# Patient Record
Sex: FEMALE | Race: BLACK OR AFRICAN AMERICAN | NOT HISPANIC OR LATINO | Employment: OTHER | URBAN - METROPOLITAN AREA
[De-identification: names, ages, dates, MRNs, and addresses within clinical notes are randomized per-mention and may not be internally consistent; named-entity substitution may affect disease eponyms.]

---

## 2025-01-01 ENCOUNTER — APPOINTMENT (OUTPATIENT)
Dept: RADIOLOGY | Facility: HOSPITAL | Age: 75
DRG: 871 | End: 2025-01-01
Payer: MEDICARE

## 2025-01-01 ENCOUNTER — APPOINTMENT (OUTPATIENT)
Dept: CARDIOLOGY | Facility: HOSPITAL | Age: 75
DRG: 871 | End: 2025-01-01
Payer: MEDICARE

## 2025-01-01 ENCOUNTER — HOSPITAL ENCOUNTER (INPATIENT)
Facility: HOSPITAL | Age: 75
LOS: 2 days | DRG: 871 | End: 2025-06-20
Attending: EMERGENCY MEDICINE | Admitting: INTERNAL MEDICINE
Payer: MEDICARE

## 2025-01-01 ENCOUNTER — LAB REQUISITION (OUTPATIENT)
Dept: LAB | Facility: HOSPITAL | Age: 75
End: 2025-01-01
Payer: MEDICARE

## 2025-01-01 VITALS
RESPIRATION RATE: 22 BRPM | WEIGHT: 115 LBS | HEART RATE: 44 BPM | SYSTOLIC BLOOD PRESSURE: 47 MMHG | BODY MASS INDEX: 21.16 KG/M2 | HEIGHT: 62 IN | DIASTOLIC BLOOD PRESSURE: 23 MMHG | TEMPERATURE: 99 F | OXYGEN SATURATION: 91 %

## 2025-01-01 DIAGNOSIS — J18.9 MULTIFOCAL PNEUMONIA: Primary | ICD-10-CM

## 2025-01-01 DIAGNOSIS — R62.7 ADULT FAILURE TO THRIVE: ICD-10-CM

## 2025-01-01 DIAGNOSIS — R41.82 ALTERED MENTAL STATUS, UNSPECIFIED ALTERED MENTAL STATUS TYPE: ICD-10-CM

## 2025-01-01 DIAGNOSIS — N17.9 ACUTE RENAL FAILURE, UNSPECIFIED ACUTE RENAL FAILURE TYPE: ICD-10-CM

## 2025-01-01 DIAGNOSIS — R73.9 HYPERGLYCEMIA: ICD-10-CM

## 2025-01-01 LAB
ALBUMIN SERPL BCP-MCNC: 2.4 G/DL (ref 3.4–5)
ALBUMIN SERPL BCP-MCNC: 3.5 G/DL (ref 3.4–5)
ALP SERPL-CCNC: 519 U/L (ref 33–136)
ALP SERPL-CCNC: 628 U/L (ref 33–136)
ALT SERPL W P-5'-P-CCNC: 66 U/L (ref 7–45)
ALT SERPL W P-5'-P-CCNC: 89 U/L (ref 7–45)
AMMONIA PLAS-SCNC: 54 UMOL/L (ref 16–53)
ANION GAP SERPL CALC-SCNC: 16 MMOL/L (ref 10–20)
ANION GAP SERPL CALC-SCNC: 17 MMOL/L (ref 10–20)
ANION GAP SERPL CALC-SCNC: 19 MMOL/L (ref 10–20)
ANION GAP SERPL CALC-SCNC: 20 MMOL/L (ref 10–20)
ANION GAP SERPL CALC-SCNC: 20 MMOL/L (ref 10–20)
APPEARANCE UR: ABNORMAL
AST SERPL W P-5'-P-CCNC: 138 U/L (ref 9–39)
AST SERPL W P-5'-P-CCNC: 148 U/L (ref 9–39)
BASE EXCESS BLDV CALC-SCNC: -5 MMOL/L (ref -2–3)
BASOPHILS # BLD AUTO: 0.03 X10*3/UL (ref 0–0.1)
BASOPHILS # BLD AUTO: 0.03 X10*3/UL (ref 0–0.1)
BASOPHILS NFR BLD AUTO: 0.5 %
BASOPHILS NFR BLD AUTO: 0.5 %
BILIRUB SERPL-MCNC: 0.9 MG/DL (ref 0–1.2)
BILIRUB SERPL-MCNC: 1.3 MG/DL (ref 0–1.2)
BILIRUB UR STRIP.AUTO-MCNC: ABNORMAL MG/DL
BODY TEMPERATURE: 37 DEGREES CELSIUS
BUN SERPL-MCNC: 46 MG/DL (ref 6–23)
BUN SERPL-MCNC: 55 MG/DL (ref 6–23)
BUN SERPL-MCNC: 57 MG/DL (ref 6–23)
BUN SERPL-MCNC: 62 MG/DL (ref 6–23)
BUN SERPL-MCNC: 65 MG/DL (ref 6–23)
CALCIUM SERPL-MCNC: 8.2 MG/DL (ref 8.6–10.3)
CALCIUM SERPL-MCNC: 8.5 MG/DL (ref 8.6–10.3)
CALCIUM SERPL-MCNC: 8.9 MG/DL (ref 8.6–10.3)
CALCIUM SERPL-MCNC: 9.2 MG/DL (ref 8.6–10.3)
CALCIUM SERPL-MCNC: 9.3 MG/DL (ref 8.6–10.3)
CARDIAC TROPONIN I PNL SERPL HS: 13 NG/L (ref 0–13)
CHLORIDE SERPL-SCNC: 104 MMOL/L (ref 98–107)
CHLORIDE SERPL-SCNC: 104 MMOL/L (ref 98–107)
CHLORIDE SERPL-SCNC: 106 MMOL/L (ref 98–107)
CHLORIDE SERPL-SCNC: 110 MMOL/L (ref 98–107)
CHLORIDE SERPL-SCNC: 114 MMOL/L (ref 98–107)
CO2 SERPL-SCNC: 15 MMOL/L (ref 21–32)
CO2 SERPL-SCNC: 17 MMOL/L (ref 21–32)
CO2 SERPL-SCNC: 18 MMOL/L (ref 21–32)
CO2 SERPL-SCNC: 18 MMOL/L (ref 21–32)
CO2 SERPL-SCNC: 21 MMOL/L (ref 21–32)
COLOR UR: ABNORMAL
CREAT SERPL-MCNC: 1.59 MG/DL (ref 0.5–1.05)
CREAT SERPL-MCNC: 2.21 MG/DL (ref 0.5–1.05)
CREAT SERPL-MCNC: 2.24 MG/DL (ref 0.5–1.05)
CREAT SERPL-MCNC: 2.25 MG/DL (ref 0.5–1.05)
CREAT SERPL-MCNC: 2.39 MG/DL (ref 0.5–1.05)
EGFRCR SERPLBLD CKD-EPI 2021: 21 ML/MIN/1.73M*2
EGFRCR SERPLBLD CKD-EPI 2021: 22 ML/MIN/1.73M*2
EGFRCR SERPLBLD CKD-EPI 2021: 22 ML/MIN/1.73M*2
EGFRCR SERPLBLD CKD-EPI 2021: 23 ML/MIN/1.73M*2
EGFRCR SERPLBLD CKD-EPI 2021: 34 ML/MIN/1.73M*2
EOSINOPHIL # BLD AUTO: 0 X10*3/UL (ref 0–0.4)
EOSINOPHIL # BLD AUTO: 0.1 X10*3/UL (ref 0–0.4)
EOSINOPHIL NFR BLD AUTO: 0 %
EOSINOPHIL NFR BLD AUTO: 1.6 %
ERYTHROCYTE [DISTWIDTH] IN BLOOD BY AUTOMATED COUNT: 15.9 % (ref 11.5–14.5)
ERYTHROCYTE [DISTWIDTH] IN BLOOD BY AUTOMATED COUNT: 16 % (ref 11.5–14.5)
ERYTHROCYTE [DISTWIDTH] IN BLOOD BY AUTOMATED COUNT: 16.3 % (ref 11.5–14.5)
ERYTHROCYTE [DISTWIDTH] IN BLOOD BY AUTOMATED COUNT: 16.9 % (ref 11.5–14.5)
FLUAV RNA RESP QL NAA+PROBE: NOT DETECTED
FLUBV RNA RESP QL NAA+PROBE: NOT DETECTED
GLUCOSE BLD MANUAL STRIP-MCNC: 120 MG/DL (ref 74–99)
GLUCOSE BLD MANUAL STRIP-MCNC: 126 MG/DL (ref 74–99)
GLUCOSE BLD MANUAL STRIP-MCNC: 128 MG/DL (ref 74–99)
GLUCOSE BLD MANUAL STRIP-MCNC: 134 MG/DL (ref 74–99)
GLUCOSE BLD MANUAL STRIP-MCNC: 143 MG/DL (ref 74–99)
GLUCOSE BLD MANUAL STRIP-MCNC: 146 MG/DL (ref 74–99)
GLUCOSE BLD MANUAL STRIP-MCNC: 148 MG/DL (ref 74–99)
GLUCOSE BLD MANUAL STRIP-MCNC: 149 MG/DL (ref 74–99)
GLUCOSE BLD MANUAL STRIP-MCNC: 156 MG/DL (ref 74–99)
GLUCOSE BLD MANUAL STRIP-MCNC: 157 MG/DL (ref 74–99)
GLUCOSE BLD MANUAL STRIP-MCNC: 191 MG/DL (ref 74–99)
GLUCOSE BLD MANUAL STRIP-MCNC: 205 MG/DL (ref 74–99)
GLUCOSE BLD MANUAL STRIP-MCNC: 206 MG/DL (ref 74–99)
GLUCOSE BLD MANUAL STRIP-MCNC: 228 MG/DL (ref 74–99)
GLUCOSE BLD MANUAL STRIP-MCNC: 276 MG/DL (ref 74–99)
GLUCOSE SERPL-MCNC: 145 MG/DL (ref 74–99)
GLUCOSE SERPL-MCNC: 145 MG/DL (ref 74–99)
GLUCOSE SERPL-MCNC: 158 MG/DL (ref 74–99)
GLUCOSE SERPL-MCNC: 260 MG/DL (ref 74–99)
GLUCOSE SERPL-MCNC: 267 MG/DL (ref 74–99)
GLUCOSE UR STRIP.AUTO-MCNC: ABNORMAL MG/DL
HCO3 BLDV-SCNC: 18.4 MMOL/L (ref 22–26)
HCT VFR BLD AUTO: 32.1 % (ref 36–46)
HCT VFR BLD AUTO: 34.3 % (ref 36–46)
HCT VFR BLD AUTO: 34.8 % (ref 36–46)
HCT VFR BLD AUTO: 35.4 % (ref 36–46)
HGB BLD-MCNC: 10.3 G/DL (ref 12–16)
HGB BLD-MCNC: 10.3 G/DL (ref 12–16)
HGB BLD-MCNC: 11.2 G/DL (ref 12–16)
HGB BLD-MCNC: 11.2 G/DL (ref 12–16)
HOLD SPECIMEN: NORMAL
IMM GRANULOCYTES # BLD AUTO: 0.03 X10*3/UL (ref 0–0.5)
IMM GRANULOCYTES # BLD AUTO: 0.04 X10*3/UL (ref 0–0.5)
IMM GRANULOCYTES NFR BLD AUTO: 0.5 % (ref 0–0.9)
IMM GRANULOCYTES NFR BLD AUTO: 0.7 % (ref 0–0.9)
INHALED O2 CONCENTRATION: 24 %
KETONES UR STRIP.AUTO-MCNC: NEGATIVE MG/DL
LACTATE SERPL-SCNC: 2.3 MMOL/L (ref 0.4–2)
LACTATE SERPL-SCNC: 2.6 MMOL/L (ref 0.4–2)
LACTATE SERPL-SCNC: 2.7 MMOL/L (ref 0.4–2)
LACTATE SERPL-SCNC: 2.9 MMOL/L (ref 0.4–2)
LACTATE SERPL-SCNC: 2.9 MMOL/L (ref 0.4–2)
LACTATE SERPL-SCNC: 3.2 MMOL/L (ref 0.4–2)
LACTATE SERPL-SCNC: 4.3 MMOL/L (ref 0.4–2)
LEUKOCYTE ESTERASE UR QL STRIP.AUTO: NEGATIVE
LYMPHOCYTES # BLD AUTO: 0.7 X10*3/UL (ref 0.8–3)
LYMPHOCYTES # BLD AUTO: 0.81 X10*3/UL (ref 0.8–3)
LYMPHOCYTES NFR BLD AUTO: 11.7 %
LYMPHOCYTES NFR BLD AUTO: 13.3 %
MAGNESIUM SERPL-MCNC: 2.22 MG/DL (ref 1.6–2.4)
MCH RBC QN AUTO: 28.7 PG (ref 26–34)
MCH RBC QN AUTO: 28.8 PG (ref 26–34)
MCH RBC QN AUTO: 28.9 PG (ref 26–34)
MCH RBC QN AUTO: 29.2 PG (ref 26–34)
MCHC RBC AUTO-ENTMCNC: 29.1 G/DL (ref 32–36)
MCHC RBC AUTO-ENTMCNC: 32.1 G/DL (ref 32–36)
MCHC RBC AUTO-ENTMCNC: 32.2 G/DL (ref 32–36)
MCHC RBC AUTO-ENTMCNC: 32.7 G/DL (ref 32–36)
MCV RBC AUTO: 89 FL (ref 80–100)
MCV RBC AUTO: 90 FL (ref 80–100)
MCV RBC AUTO: 90 FL (ref 80–100)
MCV RBC AUTO: 99 FL (ref 80–100)
MONOCYTES # BLD AUTO: 0.48 X10*3/UL (ref 0.05–0.8)
MONOCYTES # BLD AUTO: 0.5 X10*3/UL (ref 0.05–0.8)
MONOCYTES NFR BLD AUTO: 8 %
MONOCYTES NFR BLD AUTO: 8.2 %
MRSA DNA SPEC QL NAA+PROBE: NOT DETECTED
NEUTROPHILS # BLD AUTO: 4.63 X10*3/UL (ref 1.6–5.5)
NEUTROPHILS # BLD AUTO: 4.74 X10*3/UL (ref 1.6–5.5)
NEUTROPHILS NFR BLD AUTO: 75.9 %
NEUTROPHILS NFR BLD AUTO: 79.1 %
NITRITE UR QL STRIP.AUTO: NEGATIVE
NRBC BLD-RTO: 0 /100 WBCS (ref 0–0)
NRBC BLD-RTO: 0.6 /100 WBCS (ref 0–0)
OXYHGB MFR BLDV: 91 % (ref 45–75)
PCO2 BLDV: 29 MM HG (ref 41–51)
PH BLDV: 7.41 PH (ref 7.33–7.43)
PH UR STRIP.AUTO: 5 [PH]
PLATELET # BLD AUTO: 155 X10*3/UL (ref 150–450)
PLATELET # BLD AUTO: 181 X10*3/UL (ref 150–450)
PLATELET # BLD AUTO: 203 X10*3/UL (ref 150–450)
PLATELET # BLD AUTO: 205 X10*3/UL (ref 150–450)
PO2 BLDV: 66 MM HG (ref 35–45)
POTASSIUM SERPL-SCNC: 4.8 MMOL/L (ref 3.5–5.3)
POTASSIUM SERPL-SCNC: 5.1 MMOL/L (ref 3.5–5.3)
POTASSIUM SERPL-SCNC: 5.4 MMOL/L (ref 3.5–5.3)
POTASSIUM SERPL-SCNC: 5.5 MMOL/L (ref 3.5–5.3)
POTASSIUM SERPL-SCNC: 5.6 MMOL/L (ref 3.5–5.3)
PROT SERPL-MCNC: 4.9 G/DL (ref 6.4–8.2)
PROT SERPL-MCNC: 6.9 G/DL (ref 6.4–8.2)
PROT UR STRIP.AUTO-MCNC: ABNORMAL MG/DL
RBC # BLD AUTO: 3.56 X10*6/UL (ref 4–5.2)
RBC # BLD AUTO: 3.58 X10*6/UL (ref 4–5.2)
RBC # BLD AUTO: 3.83 X10*6/UL (ref 4–5.2)
RBC # BLD AUTO: 3.9 X10*6/UL (ref 4–5.2)
RBC # UR STRIP.AUTO: NEGATIVE MG/DL
RBC #/AREA URNS AUTO: ABNORMAL /HPF
SAO2 % BLDV: 93 % (ref 45–75)
SARS-COV-2 RNA RESP QL NAA+PROBE: NOT DETECTED
SODIUM SERPL-SCNC: 136 MMOL/L (ref 136–145)
SODIUM SERPL-SCNC: 141 MMOL/L (ref 136–145)
SODIUM SERPL-SCNC: 143 MMOL/L (ref 136–145)
SP GR UR STRIP.AUTO: 1.02
UROBILINOGEN UR STRIP.AUTO-MCNC: ABNORMAL MG/DL
WBC # BLD AUTO: 6 X10*3/UL (ref 4.4–11.3)
WBC # BLD AUTO: 6.1 X10*3/UL (ref 4.4–11.3)
WBC # BLD AUTO: 6.2 X10*3/UL (ref 4.4–11.3)
WBC # BLD AUTO: 8.3 X10*3/UL (ref 4.4–11.3)
WBC #/AREA URNS AUTO: ABNORMAL /HPF

## 2025-01-01 PROCEDURE — 84075 ASSAY ALKALINE PHOSPHATASE: CPT | Performed by: INTERNAL MEDICINE

## 2025-01-01 PROCEDURE — 84484 ASSAY OF TROPONIN QUANT: CPT | Performed by: PHYSICIAN ASSISTANT

## 2025-01-01 PROCEDURE — 83605 ASSAY OF LACTIC ACID: CPT

## 2025-01-01 PROCEDURE — 36415 COLL VENOUS BLD VENIPUNCTURE: CPT | Performed by: INTERNAL MEDICINE

## 2025-01-01 PROCEDURE — 36415 COLL VENOUS BLD VENIPUNCTURE: CPT

## 2025-01-01 PROCEDURE — 82140 ASSAY OF AMMONIA: CPT | Performed by: PHYSICIAN ASSISTANT

## 2025-01-01 PROCEDURE — 2500000004 HC RX 250 GENERAL PHARMACY W/ HCPCS (ALT 636 FOR OP/ED): Performed by: NURSE PRACTITIONER

## 2025-01-01 PROCEDURE — 2500000004 HC RX 250 GENERAL PHARMACY W/ HCPCS (ALT 636 FOR OP/ED)

## 2025-01-01 PROCEDURE — 2500000002 HC RX 250 W HCPCS SELF ADMINISTERED DRUGS (ALT 637 FOR MEDICARE OP, ALT 636 FOR OP/ED): Performed by: NURSE PRACTITIONER

## 2025-01-01 PROCEDURE — 80053 COMPREHEN METABOLIC PANEL: CPT | Performed by: PHYSICIAN ASSISTANT

## 2025-01-01 PROCEDURE — 83605 ASSAY OF LACTIC ACID: CPT | Performed by: NURSE PRACTITIONER

## 2025-01-01 PROCEDURE — 70450 CT HEAD/BRAIN W/O DYE: CPT | Performed by: STUDENT IN AN ORGANIZED HEALTH CARE EDUCATION/TRAINING PROGRAM

## 2025-01-01 PROCEDURE — 80048 BASIC METABOLIC PNL TOTAL CA: CPT | Mod: OUT | Performed by: INTERNAL MEDICINE

## 2025-01-01 PROCEDURE — 2060000001 HC INTERMEDIATE ICU ROOM DAILY

## 2025-01-01 PROCEDURE — 85027 COMPLETE CBC AUTOMATED: CPT | Performed by: NURSE PRACTITIONER

## 2025-01-01 PROCEDURE — 80048 BASIC METABOLIC PNL TOTAL CA: CPT

## 2025-01-01 PROCEDURE — 87636 SARSCOV2 & INF A&B AMP PRB: CPT | Performed by: PHYSICIAN ASSISTANT

## 2025-01-01 PROCEDURE — 70450 CT HEAD/BRAIN W/O DYE: CPT

## 2025-01-01 PROCEDURE — 83605 ASSAY OF LACTIC ACID: CPT | Performed by: INTERNAL MEDICINE

## 2025-01-01 PROCEDURE — 87040 BLOOD CULTURE FOR BACTERIA: CPT | Mod: PARLAB | Performed by: PHYSICIAN ASSISTANT

## 2025-01-01 PROCEDURE — 99285 EMERGENCY DEPT VISIT HI MDM: CPT | Performed by: EMERGENCY MEDICINE

## 2025-01-01 PROCEDURE — 36415 COLL VENOUS BLD VENIPUNCTURE: CPT | Performed by: PHYSICIAN ASSISTANT

## 2025-01-01 PROCEDURE — 82947 ASSAY GLUCOSE BLOOD QUANT: CPT

## 2025-01-01 PROCEDURE — 85027 COMPLETE CBC AUTOMATED: CPT

## 2025-01-01 PROCEDURE — 85025 COMPLETE CBC W/AUTO DIFF WBC: CPT | Mod: OUT | Performed by: INTERNAL MEDICINE

## 2025-01-01 PROCEDURE — 87075 CULTR BACTERIA EXCEPT BLOOD: CPT | Mod: PARLAB | Performed by: PHYSICIAN ASSISTANT

## 2025-01-01 PROCEDURE — 2500000004 HC RX 250 GENERAL PHARMACY W/ HCPCS (ALT 636 FOR OP/ED): Performed by: PHYSICIAN ASSISTANT

## 2025-01-01 PROCEDURE — 80048 BASIC METABOLIC PNL TOTAL CA: CPT | Mod: CCI | Performed by: NURSE PRACTITIONER

## 2025-01-01 PROCEDURE — 82805 BLOOD GASES W/O2 SATURATION: CPT | Performed by: PHYSICIAN ASSISTANT

## 2025-01-01 PROCEDURE — 94640 AIRWAY INHALATION TREATMENT: CPT

## 2025-01-01 PROCEDURE — 99222 1ST HOSP IP/OBS MODERATE 55: CPT | Performed by: NURSE PRACTITIONER

## 2025-01-01 PROCEDURE — 87640 STAPH A DNA AMP PROBE: CPT | Performed by: PHYSICIAN ASSISTANT

## 2025-01-01 PROCEDURE — 99231 SBSQ HOSP IP/OBS SF/LOW 25: CPT | Performed by: INTERNAL MEDICINE

## 2025-01-01 PROCEDURE — 83605 ASSAY OF LACTIC ACID: CPT | Performed by: PHYSICIAN ASSISTANT

## 2025-01-01 PROCEDURE — 96365 THER/PROPH/DIAG IV INF INIT: CPT

## 2025-01-01 PROCEDURE — 81001 URINALYSIS AUTO W/SCOPE: CPT | Performed by: PHYSICIAN ASSISTANT

## 2025-01-01 PROCEDURE — 85025 COMPLETE CBC W/AUTO DIFF WBC: CPT | Performed by: PHYSICIAN ASSISTANT

## 2025-01-01 PROCEDURE — 71045 X-RAY EXAM CHEST 1 VIEW: CPT | Mod: FOREIGN READ | Performed by: RADIOLOGY

## 2025-01-01 PROCEDURE — 83735 ASSAY OF MAGNESIUM: CPT | Performed by: PHYSICIAN ASSISTANT

## 2025-01-01 PROCEDURE — 71045 X-RAY EXAM CHEST 1 VIEW: CPT

## 2025-01-01 PROCEDURE — 2500000004 HC RX 250 GENERAL PHARMACY W/ HCPCS (ALT 636 FOR OP/ED): Performed by: INTERNAL MEDICINE

## 2025-01-01 PROCEDURE — 93005 ELECTROCARDIOGRAM TRACING: CPT

## 2025-01-01 RX ORDER — DEXAMETHASONE 4 MG/1
2 TABLET ORAL DAILY
Status: DISCONTINUED | OUTPATIENT
Start: 2025-01-01 | End: 2025-01-01 | Stop reason: HOSPADM

## 2025-01-01 RX ORDER — PRAVASTATIN SODIUM 10 MG/1
10 TABLET ORAL NIGHTLY
COMMUNITY

## 2025-01-01 RX ORDER — DEXAMETHASONE 2 MG/1
2 TABLET ORAL DAILY
COMMUNITY

## 2025-01-01 RX ORDER — ONDANSETRON 8 MG/1
8 TABLET, FILM COATED ORAL EVERY 8 HOURS PRN
COMMUNITY

## 2025-01-01 RX ORDER — VANCOMYCIN HYDROCHLORIDE 1 G/20ML
INJECTION, POWDER, LYOPHILIZED, FOR SOLUTION INTRAVENOUS DAILY PRN
Status: DISCONTINUED | OUTPATIENT
Start: 2025-01-01 | End: 2025-01-01

## 2025-01-01 RX ORDER — FUROSEMIDE 20 MG/1
20 TABLET ORAL DAILY
Status: DISCONTINUED | OUTPATIENT
Start: 2025-01-01 | End: 2025-01-01 | Stop reason: HOSPADM

## 2025-01-01 RX ORDER — MORPHINE SULFATE 2 MG/ML
2 INJECTION, SOLUTION INTRAMUSCULAR; INTRAVENOUS EVERY 4 HOURS PRN
Status: DISCONTINUED | OUTPATIENT
Start: 2025-01-01 | End: 2025-01-01 | Stop reason: HOSPADM

## 2025-01-01 RX ORDER — VANCOMYCIN HYDROCHLORIDE 1 G/200ML
1 INJECTION, SOLUTION INTRAVENOUS ONCE
Status: COMPLETED | OUTPATIENT
Start: 2025-01-01 | End: 2025-01-01

## 2025-01-01 RX ORDER — SODIUM CHLORIDE 1000 MG
1 TABLET, SOLUBLE MISCELLANEOUS 3 TIMES DAILY
Status: DISCONTINUED | OUTPATIENT
Start: 2025-01-01 | End: 2025-01-01 | Stop reason: HOSPADM

## 2025-01-01 RX ORDER — PRAVASTATIN SODIUM 20 MG/1
10 TABLET ORAL NIGHTLY
Status: DISCONTINUED | OUTPATIENT
Start: 2025-01-01 | End: 2025-01-01 | Stop reason: HOSPADM

## 2025-01-01 RX ORDER — SODIUM CHLORIDE, SODIUM LACTATE, POTASSIUM CHLORIDE, CALCIUM CHLORIDE 600; 310; 30; 20 MG/100ML; MG/100ML; MG/100ML; MG/100ML
75 INJECTION, SOLUTION INTRAVENOUS CONTINUOUS
Status: ACTIVE | OUTPATIENT
Start: 2025-01-01 | End: 2025-01-01

## 2025-01-01 RX ORDER — FOLIC ACID 1 MG/1
1 TABLET ORAL DAILY
COMMUNITY

## 2025-01-01 RX ORDER — FAMOTIDINE 20 MG/1
20 TABLET, FILM COATED ORAL DAILY
Status: DISCONTINUED | OUTPATIENT
Start: 2025-01-01 | End: 2025-01-01

## 2025-01-01 RX ORDER — METFORMIN HYDROCHLORIDE 500 MG/1
1000 TABLET ORAL
Status: DISCONTINUED | OUTPATIENT
Start: 2025-01-01 | End: 2025-01-01 | Stop reason: HOSPADM

## 2025-01-01 RX ORDER — OXYCODONE HYDROCHLORIDE 5 MG/1
5 TABLET ORAL
COMMUNITY

## 2025-01-01 RX ORDER — HEPARIN SODIUM 5000 [USP'U]/ML
5000 INJECTION, SOLUTION INTRAVENOUS; SUBCUTANEOUS EVERY 8 HOURS
Status: DISCONTINUED | OUTPATIENT
Start: 2025-01-01 | End: 2025-01-01 | Stop reason: HOSPADM

## 2025-01-01 RX ORDER — DEXTROSE 50 % IN WATER (D50W) INTRAVENOUS SYRINGE
25
Status: DISCONTINUED | OUTPATIENT
Start: 2025-01-01 | End: 2025-01-01 | Stop reason: HOSPADM

## 2025-01-01 RX ORDER — IPRATROPIUM BROMIDE AND ALBUTEROL SULFATE 2.5; .5 MG/3ML; MG/3ML
3 SOLUTION RESPIRATORY (INHALATION)
COMMUNITY

## 2025-01-01 RX ORDER — FAMOTIDINE 20 MG/1
10 TABLET, FILM COATED ORAL
Status: DISCONTINUED | OUTPATIENT
Start: 2025-06-21 | End: 2025-01-01 | Stop reason: HOSPADM

## 2025-01-01 RX ORDER — INSULIN LISPRO 100 [IU]/ML
0-10 INJECTION, SOLUTION INTRAVENOUS; SUBCUTANEOUS EVERY 4 HOURS
Status: DISCONTINUED | OUTPATIENT
Start: 2025-01-01 | End: 2025-01-01 | Stop reason: HOSPADM

## 2025-01-01 RX ORDER — LEVOFLOXACIN 25 MG/ML
500 SOLUTION ORAL DAILY
COMMUNITY

## 2025-01-01 RX ORDER — OXYCODONE HYDROCHLORIDE 5 MG/1
5 TABLET ORAL
Refills: 0 | Status: DISCONTINUED | OUTPATIENT
Start: 2025-01-01 | End: 2025-01-01 | Stop reason: HOSPADM

## 2025-01-01 RX ORDER — LORAZEPAM 2 MG/ML
2 INJECTION INTRAMUSCULAR EVERY 4 HOURS PRN
Status: DISCONTINUED | OUTPATIENT
Start: 2025-01-01 | End: 2025-01-01 | Stop reason: HOSPADM

## 2025-01-01 RX ORDER — POLYETHYLENE GLYCOL 3350 17 G/17G
17 POWDER, FOR SOLUTION ORAL DAILY PRN
COMMUNITY

## 2025-01-01 RX ORDER — ACETAMINOPHEN 650 MG/1
650 SUPPOSITORY RECTAL EVERY 4 HOURS PRN
Status: DISCONTINUED | OUTPATIENT
Start: 2025-01-01 | End: 2025-01-01 | Stop reason: HOSPADM

## 2025-01-01 RX ORDER — VIT C/E/ZN/COPPR/LUTEIN/ZEAXAN 250MG-90MG
25 CAPSULE ORAL DAILY
COMMUNITY

## 2025-01-01 RX ORDER — ONDANSETRON 4 MG/1
8 TABLET, FILM COATED ORAL EVERY 12 HOURS PRN
Status: DISCONTINUED | OUTPATIENT
Start: 2025-01-01 | End: 2025-01-01 | Stop reason: HOSPADM

## 2025-01-01 RX ORDER — SODIUM CHLORIDE 1000 MG
1 TABLET, SOLUBLE MISCELLANEOUS 3 TIMES DAILY
COMMUNITY

## 2025-01-01 RX ORDER — FUROSEMIDE 20 MG/1
20 TABLET ORAL DAILY
COMMUNITY

## 2025-01-01 RX ORDER — DEXTROSE 50 % IN WATER (D50W) INTRAVENOUS SYRINGE
12.5
Status: DISCONTINUED | OUTPATIENT
Start: 2025-01-01 | End: 2025-01-01 | Stop reason: HOSPADM

## 2025-01-01 RX ORDER — MEMANTINE HYDROCHLORIDE 10 MG/1
10 TABLET ORAL DAILY
COMMUNITY

## 2025-01-01 RX ORDER — POLYETHYLENE GLYCOL 3350 17 G/17G
17 POWDER, FOR SOLUTION ORAL DAILY PRN
Status: DISCONTINUED | OUTPATIENT
Start: 2025-01-01 | End: 2025-01-01 | Stop reason: HOSPADM

## 2025-01-01 RX ORDER — IPRATROPIUM BROMIDE AND ALBUTEROL SULFATE 2.5; .5 MG/3ML; MG/3ML
3 SOLUTION RESPIRATORY (INHALATION)
Status: DISCONTINUED | OUTPATIENT
Start: 2025-01-01 | End: 2025-01-01

## 2025-01-01 RX ORDER — BISACODYL 10 MG/1
10 SUPPOSITORY RECTAL DAILY PRN
COMMUNITY

## 2025-01-01 RX ORDER — FOLIC ACID 1 MG/1
1 TABLET ORAL DAILY
Status: DISCONTINUED | OUTPATIENT
Start: 2025-01-01 | End: 2025-01-01 | Stop reason: HOSPADM

## 2025-01-01 RX ORDER — IPRATROPIUM BROMIDE AND ALBUTEROL SULFATE 2.5; .5 MG/3ML; MG/3ML
3 SOLUTION RESPIRATORY (INHALATION) EVERY 2 HOUR PRN
Status: DISCONTINUED | OUTPATIENT
Start: 2025-01-01 | End: 2025-01-01 | Stop reason: HOSPADM

## 2025-01-01 RX ORDER — METFORMIN HYDROCHLORIDE 500 MG/1
1000 TABLET ORAL
COMMUNITY

## 2025-01-01 RX ORDER — BISACODYL 10 MG/1
10 SUPPOSITORY RECTAL DAILY PRN
Status: DISCONTINUED | OUTPATIENT
Start: 2025-01-01 | End: 2025-01-01 | Stop reason: HOSPADM

## 2025-01-01 RX ORDER — SODIUM CHLORIDE, SODIUM LACTATE, POTASSIUM CHLORIDE, CALCIUM CHLORIDE 600; 310; 30; 20 MG/100ML; MG/100ML; MG/100ML; MG/100ML
75 INJECTION, SOLUTION INTRAVENOUS CONTINUOUS
Status: DISCONTINUED | OUTPATIENT
Start: 2025-01-01 | End: 2025-01-01 | Stop reason: HOSPADM

## 2025-01-01 RX ORDER — MEMANTINE HYDROCHLORIDE 5 MG/1
10 TABLET ORAL DAILY
Status: DISCONTINUED | OUTPATIENT
Start: 2025-01-01 | End: 2025-01-01 | Stop reason: HOSPADM

## 2025-01-01 RX ORDER — ACETAMINOPHEN 325 MG/1
650 TABLET ORAL EVERY 6 HOURS PRN
COMMUNITY

## 2025-01-01 RX ORDER — FAMOTIDINE 20 MG/1
20 TABLET, FILM COATED ORAL DAILY
COMMUNITY

## 2025-01-01 RX ADMIN — PIPERACILLIN SODIUM AND TAZOBACTAM SODIUM 2.25 G: 2; .25 INJECTION, SOLUTION INTRAVENOUS at 02:55

## 2025-01-01 RX ADMIN — PIPERACILLIN SODIUM AND TAZOBACTAM SODIUM 2.25 G: 2; .25 INJECTION, SOLUTION INTRAVENOUS at 13:41

## 2025-01-01 RX ADMIN — PIPERACILLIN SODIUM AND TAZOBACTAM SODIUM 2.25 G: 2; .25 INJECTION, SOLUTION INTRAVENOUS at 22:00

## 2025-01-01 RX ADMIN — INSULIN LISPRO 2 UNITS: 100 INJECTION, SOLUTION INTRAVENOUS; SUBCUTANEOUS at 04:03

## 2025-01-01 RX ADMIN — INSULIN LISPRO 1 UNITS: 100 INJECTION, SOLUTION INTRAVENOUS; SUBCUTANEOUS at 11:59

## 2025-01-01 RX ADMIN — SODIUM CHLORIDE, SODIUM LACTATE, POTASSIUM CHLORIDE, AND CALCIUM CHLORIDE 75 ML/HR: .6; .31; .03; .02 INJECTION, SOLUTION INTRAVENOUS at 12:35

## 2025-01-01 RX ADMIN — HEPARIN SODIUM 5000 UNITS: 5000 INJECTION, SOLUTION INTRAVENOUS; SUBCUTANEOUS at 12:33

## 2025-01-01 RX ADMIN — INSULIN LISPRO 6 UNITS: 100 INJECTION, SOLUTION INTRAVENOUS; SUBCUTANEOUS at 12:08

## 2025-01-01 RX ADMIN — SODIUM CHLORIDE, SODIUM LACTATE, POTASSIUM CHLORIDE, AND CALCIUM CHLORIDE 75 ML/HR: .6; .31; .03; .02 INJECTION, SOLUTION INTRAVENOUS at 16:40

## 2025-01-01 RX ADMIN — SODIUM CHLORIDE, SODIUM LACTATE, POTASSIUM CHLORIDE, AND CALCIUM CHLORIDE 75 ML/HR: .6; .31; .03; .02 INJECTION, SOLUTION INTRAVENOUS at 22:01

## 2025-01-01 RX ADMIN — SODIUM CHLORIDE, SODIUM LACTATE, POTASSIUM CHLORIDE, AND CALCIUM CHLORIDE 500 ML: .6; .31; .03; .02 INJECTION, SOLUTION INTRAVENOUS at 16:37

## 2025-01-01 RX ADMIN — PIPERACILLIN SODIUM AND TAZOBACTAM SODIUM 2.25 G: 2; .25 INJECTION, SOLUTION INTRAVENOUS at 14:08

## 2025-01-01 RX ADMIN — SODIUM CHLORIDE 1000 ML: 0.9 INJECTION, SOLUTION INTRAVENOUS at 17:34

## 2025-01-01 RX ADMIN — PIPERACILLIN SODIUM AND TAZOBACTAM SODIUM 2.25 G: 2; .25 INJECTION, SOLUTION INTRAVENOUS at 20:00

## 2025-01-01 RX ADMIN — PIPERACILLIN SODIUM AND TAZOBACTAM SODIUM 4.5 G: 4; .5 INJECTION, SOLUTION INTRAVENOUS at 02:35

## 2025-01-01 RX ADMIN — PIPERACILLIN SODIUM AND TAZOBACTAM SODIUM 2.25 G: 2; .25 INJECTION, SOLUTION INTRAVENOUS at 09:30

## 2025-01-01 RX ADMIN — VANCOMYCIN HYDROCHLORIDE 1 G: 1 INJECTION, SOLUTION INTRAVENOUS at 03:21

## 2025-01-01 RX ADMIN — HEPARIN SODIUM 5000 UNITS: 5000 INJECTION, SOLUTION INTRAVENOUS; SUBCUTANEOUS at 04:03

## 2025-01-01 RX ADMIN — PIPERACILLIN SODIUM AND TAZOBACTAM SODIUM 2.25 G: 2; .25 INJECTION, SOLUTION INTRAVENOUS at 02:20

## 2025-01-01 RX ADMIN — PIPERACILLIN SODIUM AND TAZOBACTAM SODIUM 2.25 G: 2; .25 INJECTION, SOLUTION INTRAVENOUS at 15:26

## 2025-01-01 RX ADMIN — PIPERACILLIN SODIUM AND TAZOBACTAM SODIUM 2.25 G: 2; .25 INJECTION, SOLUTION INTRAVENOUS at 07:41

## 2025-01-01 RX ADMIN — HEPARIN SODIUM 5000 UNITS: 5000 INJECTION, SOLUTION INTRAVENOUS; SUBCUTANEOUS at 11:56

## 2025-01-01 RX ADMIN — SODIUM CHLORIDE, SODIUM LACTATE, POTASSIUM CHLORIDE, AND CALCIUM CHLORIDE 50 ML/HR: .6; .31; .03; .02 INJECTION, SOLUTION INTRAVENOUS at 04:31

## 2025-01-01 RX ADMIN — IPRATROPIUM BROMIDE AND ALBUTEROL SULFATE 3 ML: .5; 3 SOLUTION RESPIRATORY (INHALATION) at 03:23

## 2025-01-01 RX ADMIN — HEPARIN SODIUM 5000 UNITS: 5000 INJECTION, SOLUTION INTRAVENOUS; SUBCUTANEOUS at 20:43

## 2025-01-01 RX ADMIN — INSULIN LISPRO 2 UNITS: 100 INJECTION, SOLUTION INTRAVENOUS; SUBCUTANEOUS at 12:33

## 2025-01-01 RX ADMIN — SODIUM CHLORIDE 1000 ML: 0.9 INJECTION, SOLUTION INTRAVENOUS at 02:35

## 2025-01-01 RX ADMIN — HEPARIN SODIUM 5000 UNITS: 5000 INJECTION, SOLUTION INTRAVENOUS; SUBCUTANEOUS at 19:57

## 2025-01-01 RX ADMIN — SODIUM CHLORIDE, SODIUM LACTATE, POTASSIUM CHLORIDE, AND CALCIUM CHLORIDE 75 ML/HR: .6; .31; .03; .02 INJECTION, SOLUTION INTRAVENOUS at 20:01

## 2025-01-01 RX ADMIN — HEPARIN SODIUM 5000 UNITS: 5000 INJECTION, SOLUTION INTRAVENOUS; SUBCUTANEOUS at 12:09

## 2025-01-01 RX ADMIN — INSULIN LISPRO 4 UNITS: 100 INJECTION, SOLUTION INTRAVENOUS; SUBCUTANEOUS at 16:09

## 2025-01-01 RX ADMIN — HEPARIN SODIUM 5000 UNITS: 5000 INJECTION, SOLUTION INTRAVENOUS; SUBCUTANEOUS at 02:20

## 2025-01-01 RX ADMIN — SODIUM CHLORIDE 500 ML: 0.9 INJECTION, SOLUTION INTRAVENOUS at 11:57

## 2025-01-01 RX ADMIN — HEPARIN SODIUM 5000 UNITS: 5000 INJECTION, SOLUTION INTRAVENOUS; SUBCUTANEOUS at 02:55

## 2025-01-01 SDOH — SOCIAL STABILITY: SOCIAL INSECURITY: DOES ANYONE TRY TO KEEP YOU FROM HAVING/CONTACTING OTHER FRIENDS OR DOING THINGS OUTSIDE YOUR HOME?: UNABLE TO ASSESS

## 2025-01-01 SDOH — SOCIAL STABILITY: SOCIAL INSECURITY
WITHIN THE LAST YEAR, HAVE YOU BEEN HUMILIATED OR EMOTIONALLY ABUSED IN OTHER WAYS BY YOUR PARTNER OR EX-PARTNER?: PATIENT UNABLE TO ANSWER

## 2025-01-01 SDOH — SOCIAL STABILITY: SOCIAL INSECURITY: HAVE YOU HAD THOUGHTS OF HARMING ANYONE ELSE?: UNABLE TO ASSESS

## 2025-01-01 SDOH — ECONOMIC STABILITY: FOOD INSECURITY
WITHIN THE PAST 12 MONTHS, YOU WORRIED THAT YOUR FOOD WOULD RUN OUT BEFORE YOU GOT THE MONEY TO BUY MORE.: PATIENT UNABLE TO ANSWER

## 2025-01-01 SDOH — SOCIAL STABILITY: SOCIAL INSECURITY: DO YOU FEEL UNSAFE GOING BACK TO THE PLACE WHERE YOU ARE LIVING?: UNABLE TO ASSESS

## 2025-01-01 SDOH — SOCIAL STABILITY: SOCIAL INSECURITY
WITHIN THE LAST YEAR, HAVE YOU BEEN RAPED OR FORCED TO HAVE ANY KIND OF SEXUAL ACTIVITY BY YOUR PARTNER OR EX-PARTNER?: PATIENT UNABLE TO ANSWER

## 2025-01-01 SDOH — ECONOMIC STABILITY: FOOD INSECURITY
WITHIN THE PAST 12 MONTHS, THE FOOD YOU BOUGHT JUST DIDN'T LAST AND YOU DIDN'T HAVE MONEY TO GET MORE.: PATIENT UNABLE TO ANSWER

## 2025-01-01 SDOH — SOCIAL STABILITY: SOCIAL INSECURITY: HAVE YOU HAD ANY THOUGHTS OF HARMING ANYONE ELSE?: UNABLE TO ASSESS

## 2025-01-01 SDOH — SOCIAL STABILITY: SOCIAL INSECURITY: ARE THERE ANY APPARENT SIGNS OF INJURIES/BEHAVIORS THAT COULD BE RELATED TO ABUSE/NEGLECT?: NO

## 2025-01-01 SDOH — SOCIAL STABILITY: SOCIAL INSECURITY: WITHIN THE LAST YEAR, HAVE YOU BEEN AFRAID OF YOUR PARTNER OR EX-PARTNER?: PATIENT UNABLE TO ANSWER

## 2025-01-01 SDOH — SOCIAL STABILITY: SOCIAL INSECURITY
WITHIN THE LAST YEAR, HAVE YOU BEEN KICKED, HIT, SLAPPED, OR OTHERWISE PHYSICALLY HURT BY YOUR PARTNER OR EX-PARTNER?: PATIENT UNABLE TO ANSWER

## 2025-01-01 SDOH — ECONOMIC STABILITY: INCOME INSECURITY
IN THE PAST 12 MONTHS HAS THE ELECTRIC, GAS, OIL, OR WATER COMPANY THREATENED TO SHUT OFF SERVICES IN YOUR HOME?: PATIENT UNABLE TO ANSWER

## 2025-01-01 SDOH — SOCIAL STABILITY: SOCIAL INSECURITY: ARE YOU OR HAVE YOU BEEN THREATENED OR ABUSED PHYSICALLY, EMOTIONALLY, OR SEXUALLY BY ANYONE?: UNABLE TO ASSESS

## 2025-01-01 SDOH — SOCIAL STABILITY: SOCIAL INSECURITY: DO YOU FEEL ANYONE HAS EXPLOITED OR TAKEN ADVANTAGE OF YOU FINANCIALLY OR OF YOUR PERSONAL PROPERTY?: UNABLE TO ASSESS

## 2025-01-01 SDOH — SOCIAL STABILITY: SOCIAL INSECURITY: WERE YOU ABLE TO COMPLETE ALL THE BEHAVIORAL HEALTH SCREENINGS?: NO

## 2025-01-01 SDOH — SOCIAL STABILITY: SOCIAL INSECURITY: ABUSE: ADULT

## 2025-01-01 SDOH — SOCIAL STABILITY: SOCIAL INSECURITY: HAS ANYONE EVER THREATENED TO HURT YOUR FAMILY OR YOUR PETS?: UNABLE TO ASSESS

## 2025-01-01 ASSESSMENT — COGNITIVE AND FUNCTIONAL STATUS - GENERAL
MOVING FROM LYING ON BACK TO SITTING ON SIDE OF FLAT BED WITH BEDRAILS: TOTAL
WALKING IN HOSPITAL ROOM: TOTAL
MOVING FROM LYING ON BACK TO SITTING ON SIDE OF FLAT BED WITH BEDRAILS: A LOT
DRESSING REGULAR UPPER BODY CLOTHING: TOTAL
DRESSING REGULAR UPPER BODY CLOTHING: A LOT
PATIENT BASELINE BEDBOUND: NO
HELP NEEDED FOR BATHING: TOTAL
CLIMB 3 TO 5 STEPS WITH RAILING: TOTAL
CLIMB 3 TO 5 STEPS WITH RAILING: A LOT
TURNING FROM BACK TO SIDE WHILE IN FLAT BAD: TOTAL
MOVING TO AND FROM BED TO CHAIR: A LOT
MOVING TO AND FROM BED TO CHAIR: TOTAL
MOBILITY SCORE: 12
DRESSING REGULAR LOWER BODY CLOTHING: TOTAL
STANDING UP FROM CHAIR USING ARMS: A LOT
TURNING FROM BACK TO SIDE WHILE IN FLAT BAD: A LOT
EATING MEALS: TOTAL
PERSONAL GROOMING: TOTAL
EATING MEALS: A LITTLE
DRESSING REGULAR LOWER BODY CLOTHING: A LOT
TOILETING: A LOT
DAILY ACTIVITIY SCORE: 6
STANDING UP FROM CHAIR USING ARMS: TOTAL
HELP NEEDED FOR BATHING: A LOT
WALKING IN HOSPITAL ROOM: A LOT
PERSONAL GROOMING: A LOT
TOILETING: TOTAL
DAILY ACTIVITIY SCORE: 13
MOBILITY SCORE: 6

## 2025-01-01 ASSESSMENT — ACTIVITIES OF DAILY LIVING (ADL)
JUDGMENT_ADEQUATE_SAFELY_COMPLETE_DAILY_ACTIVITIES: UNABLE TO ASSESS
LACK_OF_TRANSPORTATION: PATIENT UNABLE TO ANSWER
WALKS IN HOME: DEPENDENT
GROOMING: NEEDS ASSISTANCE
ASSISTIVE_DEVICE: WALKER;WHEELCHAIR
BATHING: NEEDS ASSISTANCE
HEARING - LEFT EAR: FUNCTIONAL
HEARING - RIGHT EAR: FUNCTIONAL
FEEDING YOURSELF: NEEDS ASSISTANCE
ADEQUATE_TO_COMPLETE_ADL: UNABLE TO ASSESS
PATIENT'S MEMORY ADEQUATE TO SAFELY COMPLETE DAILY ACTIVITIES?: UNABLE TO ASSESS
DRESSING YOURSELF: NEEDS ASSISTANCE
TOILETING: NEEDS ASSISTANCE

## 2025-01-01 ASSESSMENT — LIFESTYLE VARIABLES
HAVE PEOPLE ANNOYED YOU BY CRITICIZING YOUR DRINKING: NO
TOTAL SCORE: 0
AUDIT-C TOTAL SCORE: -1
HAVE YOU EVER FELT YOU SHOULD CUT DOWN ON YOUR DRINKING: NO
HOW OFTEN DO YOU HAVE 6 OR MORE DRINKS ON ONE OCCASION: PATIENT UNABLE TO ANSWER
SKIP TO QUESTIONS 9-10: 0
HOW MANY STANDARD DRINKS CONTAINING ALCOHOL DO YOU HAVE ON A TYPICAL DAY: PATIENT UNABLE TO ANSWER
EVER FELT BAD OR GUILTY ABOUT YOUR DRINKING: NO
EVER HAD A DRINK FIRST THING IN THE MORNING TO STEADY YOUR NERVES TO GET RID OF A HANGOVER: NO
AUDIT-C TOTAL SCORE: -1
HOW OFTEN DO YOU HAVE A DRINK CONTAINING ALCOHOL: PATIENT UNABLE TO ANSWER

## 2025-01-01 ASSESSMENT — PAIN SCALES - WONG BAKER
WONGBAKER_NUMERICALRESPONSE: NO HURT

## 2025-01-01 ASSESSMENT — PATIENT HEALTH QUESTIONNAIRE - PHQ9
2. FEELING DOWN, DEPRESSED OR HOPELESS: NOT AT ALL
SUM OF ALL RESPONSES TO PHQ9 QUESTIONS 1 & 2: 0
1. LITTLE INTEREST OR PLEASURE IN DOING THINGS: NOT AT ALL

## 2025-06-18 PROBLEM — J18.9 MULTIFOCAL PNEUMONIA: Status: ACTIVE | Noted: 2025-01-01

## 2025-06-18 PROBLEM — R73.9 HYPERGLYCEMIA: Status: ACTIVE | Noted: 2025-01-01

## 2025-06-18 PROBLEM — N17.9 ACUTE RENAL FAILURE: Status: ACTIVE | Noted: 2025-01-01

## 2025-06-18 PROBLEM — N30.00 ACUTE CYSTITIS: Status: ACTIVE | Noted: 2025-01-01

## 2025-06-18 PROBLEM — G93.41 ACUTE METABOLIC ENCEPHALOPATHY: Status: ACTIVE | Noted: 2025-01-01

## 2025-06-18 NOTE — H&P
History Of Present Illness  Verna Montana is a 75 y.o. female with past medical history significant for EGFR positive NSCLC with brain , liver and bone mets (s/p Osimertinib,Pemetrexed, Carboplatin), HTN, multinodular goitre, and DM presenting to emergency department from [address] [place] [address] skilled nursing rehab for evaluation of altered mental status and dehydration.  Skilled nursing staff reports that the patient was sent in for IV hydration.  They report that the patient has been declining over the last several days.  They report that the patient has had elevated blood sugars and has known metastatic brain cancer.  Patient has a reported recent hospital admission at the [facility] for a UTI, dehydration, and an acute kidney injury.    In ED, lactate 2.9 with a repeat of 2.9.  Urinalysis positive for infection with urine culture pending.  Glucose 260, potassium 5.5, BUN 57, creatinine 2.39, GFR 21, alk phos 628, , ALT 89, ammonia 54, hemoglobin 11.2, hematocrit 34.8.  Blood cultures x 2 obtained and pending.  Patient started on IV vancomycin and Zosyn.  Patient given normal saline x 1 L bolus.  Chest x-ray completed showing multilobar pneumonia.  CT of the head and C-spine completed showing multiple metastatic disease as well as concerning questionable fungal or parasitic infection per radiology review.  Patient will remain n.p.o., swallow evaluation placed.  Blood pressure 106/58, heart rate 113, respirations 24, temperature 36.8 °C, SpO2 98% on supplemental oxygen.  Patient admitted to SDU under the care of Dr. Adarsh Miranda will continue to follow.  I was asked to do H&P and place initial admission orders.  Full code.    Prior medical records reviewed by me.     Past Medical History  As above.,  Failure to thrive, HLD    Surgical History  Breast augmentation     Social History  Former smoker, no drug use, no alcohol use    Family History  Uterine cancer, hypertension     Allergies  NKDA/NKA    Review  "of Systems   Unable to perform ROS: Mental status change        Physical Exam  Constitutional:       Appearance: She is ill-appearing.   HENT:      Mouth/Throat:      Mouth: Mucous membranes are dry.   Cardiovascular:      Rate and Rhythm: Tachycardia present.   Pulmonary:      Effort: Tachypnea present.      Breath sounds: Decreased breath sounds and rhonchi present.   Abdominal:      General: Bowel sounds are normal.   Musculoskeletal:      Cervical back: Normal range of motion.   Skin:     General: Skin is warm and dry.      Capillary Refill: Capillary refill takes less than 2 seconds.   Neurological:      Mental Status: She is disoriented.          Last Recorded Vitals  Blood pressure 106/57, pulse (!) 108, temperature 36.8 °C (98.2 °F), temperature source Temporal, resp. rate (!) 37, height 1.575 m (5' 2\"), weight 52.2 kg (115 lb), SpO2 94%.    Relevant Results  CT head wo IV contrast  Result Date: 6/18/2025  Interpreted By:  Rishi Burch, STUDY: CT HEAD WO IV CONTRAST;  6/18/2025 1:19 am   INDICATION: Signs/Symptoms:change in ms. History of lung cancer per ER note.     COMPARISON: None.   ACCESSION NUMBER(S): [id number]   ORDERING CLINICIAN: JOSE DE JESUS JADE   TECHNIQUE: Noncontrast axial CT images of head were obtained with coronal and sagittal reconstructed images.   FINDINGS: BRAIN PARENCHYMA: There are multiple supratentorial and infratentorial intra-axial cystic lesions in both cerebral hemispheres and in the left cerebellar hemisphere. In for example, there is a 1.8 cm x 2.2 cm left cerebellar cystic lesion with mural nodule measuring 1.1 cm x 1.2 cm. There is also a 1.8 x 1.8 cm lesion in the left upper vermis with punctate calcifications. The largest supratentorial cystic lesion in the right centrum semi ovale measures 2 cm x 1.9 cm. Additional lesions are annotated on series 202, of which there are nearly a dozen. These lesions demonstrate minimal or no surrounding vasogenic edema. There is no " global intracranial mass-effect such as midline shift or basal cistern effacement. There are also multiple bilateral supratentorial calcifications not associated with cystic lesions, such as within the periventricular white matter of right frontal lobe, in the parasagittal bilateral parietal lobes, along the body of the left lateral ventricle in the centrum semi ovale. No juan full evidence of acute large territory ischemic infarct. No evidence of acute intraparenchymal hemorrhage.   VENTRICLES and EXTRA-AXIAL SPACES: There is a 0.7 cm x 0.3 cm lesion in the anterior horn of the left lateral ventricle. No acute extra-axial or intraventricular hemorrhage. No effacement of cerebral sulci. The ventricles and sulci are age-concordant.   PARANASAL SINUSES/MASTOIDS:  No hemorrhage or air-fluid levels within the visualized paranasal sinuses. The mastoids are well aerated.   CALVARIUM/ORBITS:  No acute skull fracture.  The orbits and globes are intact to the extent visualized.   EXTRACRANIAL SOFT TISSUES: No discernible acute abnormality.       1. Over a dozen cystic lesions intra-axial involving supratentorial and infratentorial compartments, some with associated calcifications and mural nodularity and some purely cystic and most demonstrate minimal or no surrounding vasogenic edema. In setting of known metastatic cancer these are most likely metastatic lesions. The largest in the infant internal fossa measures 2.2 cm x 1.8 cm with mural nodule in the largest in the supratentorial compartment measures 2 cm in the right centrum semi ovale. There is also a 0.7 cm intraventricular lesion in the anterior horn of the left lateral ventricle.   2. Numerous subcentimeter parenchymal calcifications in the cerebral hemispheres not associated with cystic lesions and most likely reflect areas of treated metastatic disease though could also be seen in the setting of prior intracranial fungal or parasitic infection.   3. No acute  intracranial hemorrhage, mass effect, or evidence of acute large territory ischemic infarct.   MACRO: None.   Signed by: Rishi Burch 6/18/2025 1:46 AM Dictation workstation:   [id number]    XR chest 1 view  Result Date: 6/18/2025  STUDY: Chest Radiograph;  6/18/2025  00:058 INDICATION: Weak. COMPARISON: None Available ACCESSION NUMBER(S): [id number] ORDERING CLINICIAN: JOSE DE JESUS JADE TECHNIQUE:  Frontal chest was obtained at 00:08 hours. FINDINGS: CARDIOMEDIASTINAL SILHOUETTE: Cardiomediastinal silhouette is mildly enlarged in size and configuration.  LUNGS: Airspace disease is seen in the left mid and lower lung zone. Diminished inspiration is noted with an elevated right hemidiaphragm and patchy airspace opacity in the right lung.  ABDOMEN: No remarkable upper abdominal findings.  BONES: No acute osseous changes.  Generalized osseous demineralization is noted.    Bilateral airspace disease, left greater than right, statistically multilobar pneumonia in the upper part critical setting. Signed by Rakesh Hoffman    CT cervical spine wo IV contrast  Result Date: 6/3/2025  * * *Final Report* * * DATE OF EXAM: Tino  3 2025  6:06PM   [facility]   [id number]  -  CT CERVICAL SPINE WO IVCON  / ACCESSION #  [id number] PROCEDURE REASON: Spine fracture, cervical, traumatic      * * * * Physician Interpretation * * * *  EXAMINATION:  CT BRAIN WO IVCON, CT CERVICAL SPINE WO IVCON HISTORY:  Head trauma, moderate-severe TECHNIQUE:  Serial axial images without IV were obtained from the vertex to the foramen magnum. M: CTBWO_3 CT of the cervical spine without IV contrast.   Spiral, high resolution axial images were obtained from the skull base to the cervicothoracic junction with sagittal and coronal planar reconstructions. M: CTCPWO_4 CT Dose-Length Product (DLP): 1283  mGy*cm CT Dose Reduction Employed: No dose reduction techniques were required COMPARISON:  Brain MRI 03/11/2025..  Head CT 06/03/2025.  Head CT 09/10/2024. RESULT: HEAD CT  FINDINGS: Post-operative change:  None. Acute change:   No evidence of an acute intracranial process. Hemorrhage:    No evidence of acute intracranial hemorrhage. Mass Lesion / Mass Effect: There has been no significant interval change in size of multiple metastatic disease in the supratentorial and infratentorial brain when compared to most recent prior head CT from 06/03/2025.  There are unchanged dominant lesions in the right frontal lobe and the left cerebellum with mild surrounding vasogenic edema and associated mild local mass effect without midline shift or herniation.   Several lesions are hypodense, which may be of hemorrhagic or calcification, unchanged. Chronic change:   None apparent. Parenchyma:  Mild generalized volume loss.  The brain parenchyma is otherwise within normal limits for age. Ventricles:   Ventricular enlargement concordant with the degree of parenchymal volume loss. Paranasal sinuses and skull base:  The visualized paranasal sinuses and mastoid air cells are clear.  There is unchanged 16 mm right frontal bone sclerotic lesion, likely representing metastatic disease..  The skull base and imaged soft tissues are unremarkable.  (topogram) images: No significant findings. CERVICAL CT FINDINGS: Counting reference:  Craniocervical junction. Alignment:    Alignment is anatomic. Craniocervical junction:    Craniocervical junction is normal. Osseous structures/fracture:  There is an unchanged 8 mm sclerotic lesion in the T1 vertebral body and a confluent lesion in the T3 vertebral body extending into the left pedicle, likely metastatic disease.  No evidence of a lytic or blastic process in the visualized spine.  There is unchanged mild to moderate anterior wedge compression deformity at C5.   Otherwise no evidence of acute or chronic fracture. Cervical soft tissues:   There is multinodular goiter.  The paraspinal soft tissues are within normal limits. Degenerative changes: Multilevel  degenerative changes of cervical spine, most pronounced at C6-C7 with mild left neural foraminal narrowing and at C7-T1 with moderate bilateral neural foraminal narrowing.  There is bony fusion of the C3-C4 vertebral body with severe loss of intervertebral disc space height at C4-C5, C5-C6, C6-C7, C7-T1 and T1-T2..  (topogram) images: No significant findings.    IMPRESSION: No acute intracranial hemorrhage or calvarial fracture. No acute cervical spine fracture or dislocation. Unchanged multiple metastatic disease in the supratentorial and infratentorial brain when compared to most recent prior head CT from 06/03/2025.  Associated mild local mass effect without midline shift or herniation. Unchanged sclerotic lesions in the right frontal bone, T1 vertebral body and T3 vertebral body, likely metastatic disease. : [facility]   Transcribe Date/Time: Tino  3 2025  6:14P Dictated by : YARELY CRUMP MD This examination was interpreted and the report reviewed and electronically signed by: YARELY CRUMP MD on Tino  3 2025  6:30PM  EST    CT head wo IV contrast  Result Date: 6/3/2025  * * *Final Report* * * DATE OF EXAM: Tino  3 2025  6:06PM   [facility]   0504  -  CT BRAIN WO IVCON   / ACCESSION #  [id number] PROCEDURE REASON: Head trauma, moderate-severe      * * * * Physician Interpretation * * * *  EXAMINATION:  CT BRAIN WO IVCON, CT CERVICAL SPINE WO IVCON HISTORY:  Head trauma, moderate-severe TECHNIQUE:  Serial axial images without IV were obtained from the vertex to the foramen magnum. M: CTBWO_3 CT of the cervical spine without IV contrast.   Spiral, high resolution axial images were obtained from the skull base to the cervicothoracic junction with sagittal and coronal planar reconstructions. M: CTCPWO_4 CT Dose-Length Product (DLP): 1283  mGy*cm CT Dose Reduction Employed: No dose reduction techniques were required COMPARISON:  Brain MRI 03/11/2025..  Head CT 06/03/2025.  Head CT 09/10/2024. RESULT: HEAD  CT FINDINGS: Post-operative change:  None. Acute change:   No evidence of an acute intracranial process. Hemorrhage:    No evidence of acute intracranial hemorrhage. Mass Lesion / Mass Effect: There has been no significant interval change in size of multiple metastatic disease in the supratentorial and infratentorial brain when compared to most recent prior head CT from 06/03/2025.  There are unchanged dominant lesions in the right frontal lobe and the left cerebellum with mild surrounding vasogenic edema and associated mild local mass effect without midline shift or herniation.   Several lesions are hypodense, which may be of hemorrhagic or calcification, unchanged. Chronic change:   None apparent. Parenchyma:  Mild generalized volume loss.  The brain parenchyma is otherwise within normal limits for age. Ventricles:   Ventricular enlargement concordant with the degree of parenchymal volume loss. Paranasal sinuses and skull base:  The visualized paranasal sinuses and mastoid air cells are clear.  There is unchanged 16 mm right frontal bone sclerotic lesion, likely representing metastatic disease..  The skull base and imaged soft tissues are unremarkable.  (topogram) images: No significant findings. CERVICAL CT FINDINGS: Counting reference:  Craniocervical junction. Alignment:    Alignment is anatomic. Craniocervical junction:    Craniocervical junction is normal. Osseous structures/fracture:  There is an unchanged 8 mm sclerotic lesion in the T1 vertebral body and a confluent lesion in the T3 vertebral body extending into the left pedicle, likely metastatic disease.  No evidence of a lytic or blastic process in the visualized spine.  There is unchanged mild to moderate anterior wedge compression deformity at C5.   Otherwise no evidence of acute or chronic fracture. Cervical soft tissues:   There is multinodular goiter.  The paraspinal soft tissues are within normal limits. Degenerative changes: Multilevel  degenerative changes of cervical spine, most pronounced at C6-C7 with mild left neural foraminal narrowing and at C7-T1 with moderate bilateral neural foraminal narrowing.  There is bony fusion of the C3-C4 vertebral body with severe loss of intervertebral disc space height at C4-C5, C5-C6, C6-C7, C7-T1 and T1-T2..  (topogram) images: No significant findings.    IMPRESSION: No acute intracranial hemorrhage or calvarial fracture. No acute cervical spine fracture or dislocation. Unchanged multiple metastatic disease in the supratentorial and infratentorial brain when compared to most recent prior head CT from 06/03/2025.  Associated mild local mass effect without midline shift or herniation. Unchanged sclerotic lesions in the right frontal bone, T1 vertebral body and T3 vertebral body, likely metastatic disease. : [facility]   Transcribe Date/Time: Tino  3 2025  6:14P Dictated by : YARELY CRUMP MD This examination was interpreted and the report reviewed and electronically signed by: YARELY CRUMP MD on Tino  3 2025  6:30PM  EST    Results for orders placed or performed during the hospital encounter of 06/17/25 (from the past 24 hours)   CBC and Auto Differential   Result Value Ref Range    WBC 6.0 4.4 - 11.3 x10*3/uL    nRBC 0.0 0.0 - 0.0 /100 WBCs    RBC 3.90 (L) 4.00 - 5.20 x10*6/uL    Hemoglobin 11.2 (L) 12.0 - 16.0 g/dL    Hematocrit 34.8 (L) 36.0 - 46.0 %    MCV 89 80 - 100 fL    MCH 28.7 26.0 - 34.0 pg    MCHC 32.2 32.0 - 36.0 g/dL    RDW 16.0 (H) 11.5 - 14.5 %    Platelets 203 150 - 450 x10*3/uL    Neutrophils % 79.1 40.0 - 80.0 %    Immature Granulocytes %, Automated 0.7 0.0 - 0.9 %    Lymphocytes % 11.7 13.0 - 44.0 %    Monocytes % 8.0 2.0 - 10.0 %    Eosinophils % 0.0 0.0 - 6.0 %    Basophils % 0.5 0.0 - 2.0 %    Neutrophils Absolute 4.74 1.60 - 5.50 x10*3/uL    Immature Granulocytes Absolute, Automated 0.04 0.00 - 0.50 x10*3/uL    Lymphocytes Absolute 0.70 (L) 0.80 - 3.00 x10*3/uL     Monocytes Absolute 0.48 0.05 - 0.80 x10*3/uL    Eosinophils Absolute 0.00 0.00 - 0.40 x10*3/uL    Basophils Absolute 0.03 0.00 - 0.10 x10*3/uL   Comprehensive metabolic panel   Result Value Ref Range    Glucose 260 (H) 74 - 99 mg/dL    Sodium 136 136 - 145 mmol/L    Potassium 5.5 (H) 3.5 - 5.3 mmol/L    Chloride 104 98 - 107 mmol/L    Bicarbonate 18 (L) 21 - 32 mmol/L    Anion Gap 20 10 - 20 mmol/L    Urea Nitrogen 57 (H) 6 - 23 mg/dL    Creatinine 2.39 (H) 0.50 - 1.05 mg/dL    eGFR 21 (L) >60 mL/min/1.73m*2    Calcium 9.3 8.6 - 10.3 mg/dL    Albumin 3.5 3.4 - 5.0 g/dL    Alkaline Phosphatase 628 (H) 33 - 136 U/L    Total Protein 6.9 6.4 - 8.2 g/dL     (H) 9 - 39 U/L    Bilirubin, Total 1.3 (H) 0.0 - 1.2 mg/dL    ALT 89 (H) 7 - 45 U/L   Magnesium   Result Value Ref Range    Magnesium 2.22 1.60 - 2.40 mg/dL   Lactate   Result Value Ref Range    Lactate 2.9 (H) 0.4 - 2.0 mmol/L   Blood Gas Venous   Result Value Ref Range    POCT pH, Venous 7.41 7.33 - 7.43 pH    POCT pCO2, Venous 29 (L) 41 - 51 mm Hg    POCT pO2, Venous 66 (H) 35 - 45 mm Hg    POCT SO2, Venous 93 (H) 45 - 75 %    POCT Oxy Hemoglobin, Venous 91.0 (H) 45.0 - 75.0 %    POCT Base Excess, Venous -5.0 (L) -2.0 - 3.0 mmol/L    POCT HCO3 Calculated, Venous 18.4 (L) 22.0 - 26.0 mmol/L    Patient Temperature 37.0 degrees Celsius    FiO2 24 %   Troponin I, High Sensitivity   Result Value Ref Range    Troponin I, High Sensitivity 13 0 - 13 ng/L   Ammonia   Result Value Ref Range    Ammonia 54 (H) 16 - 53 umol/L   Urinalysis with Reflex Culture and Microscopic   Result Value Ref Range    Color, Urine Dark-Yellow Light-Yellow, Yellow, Dark-Yellow    Appearance, Urine Turbid (N) Clear    Specific Gravity, Urine 1.025 1.005 - 1.035    pH, Urine 5.0 5.0, 5.5, 6.0, 6.5, 7.0, 7.5, 8.0    Protein, Urine 70 (1+) (A) NEGATIVE, 10 (TRACE), 20 (TRACE) mg/dL    Glucose, Urine 30 (TRACE) (A) Normal mg/dL    Blood, Urine NEGATIVE NEGATIVE mg/dL    Ketones, Urine NEGATIVE  NEGATIVE mg/dL    Bilirubin, Urine 0.5 (1+) (A) NEGATIVE mg/dL    Urobilinogen, Urine 4 (2+) (A) Normal mg/dL    Nitrite, Urine NEGATIVE NEGATIVE    Leukocyte Esterase, Urine NEGATIVE NEGATIVE   Urinalysis Microscopic   Result Value Ref Range    WBC, Urine 6-10 (A) 1-5, NONE /HPF    RBC, Urine 1-2 NONE, 1-2, 3-5 /HPF   Lactate   Result Value Ref Range    Lactate 2.9 (H) 0.4 - 2.0 mmol/L   MRSA Surveillance for Vancomycin De-escalation, PCR    Specimen: Anterior Nares; Swab   Result Value Ref Range    MRSA PCR Not Detected Not Detected   Sars-CoV-2 and Influenza A/B PCR   Result Value Ref Range    Flu A Result Not Detected Not Detected    Flu B Result Not Detected Not Detected    Coronavirus 2019, PCR Not Detected Not Detected   POCT GLUCOSE   Result Value Ref Range    POCT Glucose 191 (H) 74 - 99 mg/dL   CBC   Result Value Ref Range    WBC 6.2 4.4 - 11.3 x10*3/uL    nRBC 0.0 0.0 - 0.0 /100 WBCs    RBC 3.56 (L) 4.00 - 5.20 x10*6/uL    Hemoglobin 10.3 (L) 12.0 - 16.0 g/dL    Hematocrit 32.1 (L) 36.0 - 46.0 %    MCV 90 80 - 100 fL    MCH 28.9 26.0 - 34.0 pg    MCHC 32.1 32.0 - 36.0 g/dL    RDW 15.9 (H) 11.5 - 14.5 %    Platelets 181 150 - 450 x10*3/uL   Basic metabolic panel   Result Value Ref Range    Glucose 267 (H) 74 - 99 mg/dL    Sodium 136 136 - 145 mmol/L    Potassium 5.1 3.5 - 5.3 mmol/L    Chloride 106 98 - 107 mmol/L    Bicarbonate 18 (L) 21 - 32 mmol/L    Anion Gap 17 10 - 20 mmol/L    Urea Nitrogen 55 (H) 6 - 23 mg/dL    Creatinine 2.25 (H) 0.50 - 1.05 mg/dL    eGFR 22 (L) >60 mL/min/1.73m*2    Calcium 8.5 (L) 8.6 - 10.3 mg/dL       Assessment & Plan  Multifocal pneumonia    Acute renal failure    Acute cystitis    Acute metabolic encephalopathy    Hyperglycemia      Verna is a 75-year-old female patient with past medical history significant for brain, liver, and bone cancer with metastasis, hypertension, diabetes, failure to thrive, dementia, and hyperlipidemia presenting to the ED for evaluation of  altered mental status and dehydration.  Patient reportedly had a recent admission to [facility] for a UTI, dehydration, and an acute kidney injury.  Patient found to have a lactate of 2.9 with a repeat of 2.9.  Urinalysis positive for infection with urine culture pending.  Glucose 260, potassium 5.5, BUN 57, creatinine 2.39, GFR 21, alk phos 628, ammonia 54, , ALT 89.  Blood cultures obtained x 2 and pending.  Vital signs within normal limits, patient on supplemental oxygen.  Imaging consistent with metastatic cancer showing multilobar pneumonia.  Patient started on IV vancomycin and Zosyn, will remain n.p.o., provided IV maintenance fluids and admitted for further medical management.    Multifocal pneumonia/acute renal failure/acute cystitis/acute metabolic encephalopathy/hyperglycemia  Inpatient admission to SDU per Dr. Adarsh Miranda  Full code  See imaging results above  Legionella/strep urine  Blood cultures x 2  Trend lactate  DuoNeb treatments per RT  RT evaluation  Tylenol suppository as needed  N.p.o. until passed swallow evaluation  Bedside swallow evaluation  Continue IV vancomycin and Zosyn  NS x 1 L bolus in ED  Continue IV maintenance fluids  Insulin sliding scale  Repeat labs    Dementia/HLD/HTN/DM/metastatic cancer  #Chronic conditions  Nursing to complete home med rec  Patient n.p.o. until passed swallow evaluation  Monitor blood pressure  Insulin sliding scale for n.p.o.    DVT Ppx  SCDs  Heparin subcutaneous  Bedrest      I spent 55 minutes in the professional and overall care of this patient.  Evon Jenkins, APRN-CNP

## 2025-06-18 NOTE — ED PROVIDER NOTES
HPI   Chief Complaint   Patient presents with    Dehydration       75 year old female with PMH of EGFR positive NSCLC with brain , liver and bone  mets (s/p Osimertinib,Pemetrexed, Carboplatin), HTN, multinodular goitre, and DM presents from [address] [place] [address] for a complaint of change in mental status along with dehydration.  Spoke with the nursing staff  at the care facility who reported that the patient was sent in for IV hydration.  They report that patient has been deconditioning however there is no established last known well.  It is reported that the patient has had elevated blood sugar and has known metastatic brain cancer.  There are no reports of falls.  No reports of vomiting.  No reports of seizure-like activity.  They also reported that the patient recently had a hospital admission at the [facility] for a UTI, dehydration and an acute kidney injury              Patient History   Medical History[1]  Surgical History[2]  Family History[3]  Social History[4]    Physical Exam   ED Triage Vitals   Temp Pulse Resp BP   -- -- -- --      SpO2 Temp src Heart Rate Source Patient Position   -- -- -- --      BP Location FiO2 (%)     -- --       Physical Exam  Vitals and nursing note reviewed.   Constitutional:       Appearance: She is ill-appearing and toxic-appearing.   HENT:      Head: Normocephalic.      Nose: Nose normal.      Mouth/Throat:      Mouth: Mucous membranes are moist.   Eyes:      Extraocular Movements: Extraocular movements intact.      Conjunctiva/sclera: Conjunctivae normal.   Cardiovascular:      Rate and Rhythm: Regular rhythm. Tachycardia present.      Pulses: Normal pulses.   Pulmonary:      Comments: Tachypneic.  Coarse rhonchi throughout  Abdominal:      General: Abdomen is flat. There is no distension.      Palpations: Abdomen is soft.      Tenderness: There is no abdominal tenderness. There is no guarding or rebound.   Musculoskeletal:      Cervical back: Normal range  of motion and neck supple.   Skin:     General: Skin is warm and dry.      Capillary Refill: Capillary refill takes less than 2 seconds.   Neurological:      Mental Status: She is disoriented.           ED Course & MDM   ED Course as of 06/18/25 0220 Wed Jun 18, 2025 0211 WBC: 6.0 [DS]   0212 Lactate(!): 2.9 [DS]   0212 Ammonia(!): 54 [DS]   0212 GLUCOSE(!): 260 [DS]   0212 POTASSIUM(!): 5.5 [DS]   0212 Creatinine(!): 2.39 [DS]   0212 POCT pH, Venous: 7.41 [DS]   0220 IMPRESSION:  1. Over a dozen cystic lesions intra-axial involving supratentorial  and infratentorial compartments, some with associated calcifications  and mural nodularity and some purely cystic and most demonstrate  minimal or no surrounding vasogenic edema. In setting of known  metastatic cancer these are most likely metastatic lesions. The  largest in the infant internal fossa measures 2.2 cm x 1.8 cm with  mural nodule in the largest in the supratentorial compartment  measures 2 cm in the right centrum semi ovale. There is also a 0.7 cm  intraventricular lesion in the anterior horn of the left lateral  ventricle.      2. Numerous subcentimeter parenchymal calcifications in the cerebral  hemispheres not associated with cystic lesions and most likely  reflect areas of treated metastatic disease though could also be seen  in the setting of prior intracranial fungal or parasitic infection.      3. No acute intracranial hemorrhage, mass effect, or evidence of  acute large territory ischemic infarct.   [DS]      ED Course User Index  [DS] Wilmar Hobson PA-C         Diagnoses as of 06/18/25 0220   Multifocal pneumonia   Acute renal failure, unspecified acute renal failure type   Altered mental status, unspecified altered mental status type   Hyperglycemia                 No data recorded     [place] Coma Scale Score: 10 (06/17/25 2340 : Dedrick Anthony RN)                           Medical Decision Making    Medical Decision Making & ED  Course  Medical Decision Making:  Patient evaluated for suspected dehydration and change in mental status.  Unfortunately the patient has a significant past medical history of metastatic cancer with brain involvement.  She currently resides at a skilled nursing facility.  Patient had a recent hospital admission at the [facility] for urinary tract infection along with dehydration.  I spoke with the nursing staff at the care facility who reported that the patient has been declining.  Last known well was not established.  On arrival the patient was found to be alert however she would not respond to questioning.  Extensive lab work was obtained revealing no evidence of leukocytosis.  Patient's lactate level was elevated at 2.9.  Glucose elevated at 260.  No evidence of diabetic ketoacidosis.  Patient serum creatinine is markedly increased from most recent at 2.39.  Venous pH 7.41.  Chest x-ray revealed a multifocal pneumonia.  CT imaging of the brain revealed findings consistent with metastatic cancer history.  Patient was given IV hydration.  2 sets of blood cultures were obtained and sent to lab.  Viral testing was ordered.  The patient was started on broad-spectrum antibiotics.  Admission was patient be admitted to the service of her primary care physician Dr. Adarsh Miranda  --  Scoring Tools Utilized: None    Differential diagnoses considered include but are not limited to: UTI, dehydration, MERRICK     Social Determinants of Health which Significantly Impact Care: None identified The following actions were taken to address these social determinants: None    EKG Independent Interpretation: EKG interpreted by ED attending physician. Please see ED Course for full interpretation.    Independent Result Review and Interpretation: Relevant laboratory and radiographic results were reviewed and independently interpreted by myself.  As necessary, they are commented on in the ED Course.    Chronic conditions affecting  the patient's care: As documented above in [facility]    The patient was discussed with the following consultants/services: Hospitalist/Admitting Provider who accepted the patient for admission    Care Considerations: As documented above in [facility]    ED Course:  ED Course as of 06/18/25 0218  ------------------------------------------------------------  Time: 06/18 0211  Value: WBC: 6.0  Comment: (Reviewed)  By: Wilmar Hobson PA-C  ------------------------------------------------------------  Time: 06/18 0212  Value: Lactate(!): 2.9  Comment: (Reviewed)  By: Wilmar Hobson PA-C  ------------------------------------------------------------  Time: 06/18 0212  Value: Ammonia(!): 54  Comment: (Reviewed)  By: Wilmar Hobson PA-C  ------------------------------------------------------------  Time: 06/18 0212  Value: GLUCOSE(!): 260  Comment: (Reviewed)  By: Wilmar Hobson PA-C  ------------------------------------------------------------  Time: 06/18 0212  Value: POTASSIUM(!): 5.5  Comment: (Reviewed)  By: Wilmar Hobson, PA-C  ------------------------------------------------------------  Time: 06/18 0212  Value: Creatinine(!): 2.39  Comment: (Reviewed)  By: Wilmar Hobson PA-C  ------------------------------------------------------------  Time: 06/18 0212  Value: POCT pH, Venous: 7.41  Comment: (Reviewed)  By: Wilmar Hobson PA-C    ------------------------------------------------------------  Diagnoses as of 06/18/25 0218  Multifocal pneumonia  Acute renal failure, unspecified acute renal failure type  Altered mental status, unspecified altered mental status type  Hyperglycemia     Disposition  As a result of their workup, the patient will require admission to the hospital.  The patient was informed of her diagnosis.  The patient was given the opportunity to ask questions and I answered them. The patient agreed to be admitted to the hospital.      This was a shared visit with an ED attending.  The patient was seen and discussed  with the ED attending    Wilmar Hobson PA-C  Emergency Medicine            Procedure  Procedures       [1] No past medical history on file.  [2] No past surgical history on file.  [3] No family history on file.  [4]   Social History  Tobacco Use    Smoking status: Not on file    Smokeless tobacco: Not on file   Substance Use Topics    Alcohol use: Not on file    Drug use: Not on file        Wilmar Hobson PA-C  06/18/25 0223

## 2025-06-18 NOTE — PROGRESS NOTES
Sw spoke with the Liaison with [facility] and  pt was there under SNF. Pt will need a precert to return to their facility once medically cleared for DC.  Sw to follow up with pt and family to ensure they would like to return to that facility at DC pending medical course. Sw will continue to provide support and services as needed to ensure a safe dc plan is in place.

## 2025-06-18 NOTE — CONSULTS
Vancomycin Dosing by Pharmacy- INITIAL    Verna Montana is a 75 y.o. year old female who Pharmacy has been consulted for vancomycin dosing for pneumonia. Based on the patient's indication and renal status this patient will be dosed based on a goal trough/random level of 15-20.     Renal function is currently declining.    Visit Vitals  /58   Pulse (!) 113   Temp 36.8 °C (98.2 °F) (Temporal)   Resp (!) 24        Lab Results   Component Value Date    CREATININE 2.39 (H) 2025    CREATININE 1.59 (H) 2025        Patient weight is as follows:   Vitals:    25 0222   Weight: 52.2 kg (115 lb)       Cultures:  No results found for the encounter in last 14 days.    Temp (24hrs), Av.8 °C (98.2 °F), Min:36.8 °C (98.2 °F), Max:36.8 °C (98.2 °F)     Assessment/Plan     Patient will not be given a loading dose.  Was given vancomycin 1000mg x 1 dose given in ED.  Dose by levels due to renal function.  Follow-up level will be ordered on  at 0300 unless clinically indicated sooner.  Will continue to monitor renal function daily while on vancomycin and order serum creatinine at least every 48 hours if not already ordered.  Follow for continued vancomycin needs, clinical response, and signs/symptoms of toxicity.       Gilda Garner, [facility]

## 2025-06-18 NOTE — ED TRIAGE NOTES
Pt arrived via EMS from [facility] re[facility]. Staff say pt was diaphoretic and dehydrated. Facility dr placed orders for fluids, placed IV but claim they had no fluids. Upon arrival pts vitals are stable and pt is not diaphoretic. Pt is non verbal and non responsive at baseline, pt has brain cancer. Pt arrived hocked up to L bag of NS.

## 2025-06-18 NOTE — PROGRESS NOTES
Vancomycin Dosing by Pharmacy- Cessation of Therapy    Consult to pharmacy for vancomycin dosing has been discontinued by the prescriber, pharmacy will sign off at this time.    Please call pharmacy if there are further questions or re-enter a consult if vancomycin is resumed.     Gilda Garner, [facility]

## 2025-06-18 NOTE — CARE PLAN
The clinical goals for the shift include comfort      Problem: Skin  Goal: Prevent/manage excess moisture  Outcome: Progressing     Problem: Skin  Goal: Promote skin healing  Outcome: Progressing     Problem: Safety - Adult  Goal: Free from fall injury  Outcome: Progressing

## 2025-06-19 NOTE — PROGRESS NOTES
6/19/2025  No family present.  Pt not oriented.  Called and spoke with daughter. Introduced self and explained role.  Pt came in from [place]. Was skilled, there for about a week.  Daughter stated she and her brother would like hospice. Stated her brother was contacting [facility]. Stated she will be in and she and her brother would like to speak with MD. RN updated.  Support provided.   Eva ORR      Order for social work for hospice.  Secure chat to social work, asking her to follow up.    Eva Scruggs Rn [facility]

## 2025-06-19 NOTE — CARE PLAN
RN notified NP that pt's BP 89/55, map 65, . No change in condition noted. Continue IVF and antibiotics. Attending to follow.

## 2025-06-19 NOTE — PROGRESS NOTES
Order for hospice consult. Spoke with patient's son, Neno, and he requested referral to [facility]. He hopes she can go to one of their I[facility].   Referral sent via [facility]. Meeting set for tomorrow morning, 9:30-10:30am.

## 2025-06-19 NOTE — CARE PLAN
The clinical goals for the shift include comfort and rest.      Problem: Skin  Goal: Prevent/minimize sheer/friction injuries  Outcome: Progressing  Flowsheets (Taken 6/19/2025 1311)  Prevent/minimize sheer/friction injuries:   Complete micro-shifts as needed if patient unable. Adjust patient position to relieve pressure points, not a full turn   Turn/reposition every 2 hours/use positioning/transfer devices   Use pull sheet     Problem: Safety - Adult  Goal: Free from fall injury  Outcome: Progressing

## 2025-06-19 NOTE — H&P
History Of Present Illness  Verna Montana is a 75 y.o. female with past medical history significant for EGFR positive NSCLC with brain , liver and bone mets (s/p Osimertinib,Pemetrexed, Carboplatin), HTN, multinodular goitre, and DM presenting to emergency department from [address] [place] [address] skilled nursing rehab for evaluation of altered mental status and dehydration.  Skilled nursing staff reports that the patient was sent in for IV hydration.  They report that the patient has been declining over the last several days.  They report that the patient has had elevated blood sugars and has known metastatic brain cancer.  Patient has a reported recent hospital admission at the [facility] for a UTI, dehydration, and an acute kidney injury.    In ED, lactate 2.9 with a repeat of 2.9.  Urinalysis positive for infection with urine culture pending.  Glucose 260, potassium 5.5, BUN 57, creatinine 2.39, GFR 21, alk phos 628, , ALT 89, ammonia 54, hemoglobin 11.2, hematocrit 34.8.  Blood cultures x 2 obtained and pending.  Patient started on IV vancomycin and Zosyn.  Patient given normal saline x 1 L bolus.  Chest x-ray completed showing multilobar pneumonia.  CT of the head and C-spine completed showing multiple metastatic disease as well as concerning questionable fungal or parasitic infection per radiology review.  Patient will remain n.p.o., swallow evaluation placed.  Blood pressure 106/58, heart rate 113, respirations 24, temperature 36.8 °C, SpO2 98% on supplemental oxygen.  Patient admitted to SDU under the care of Dr. Adarsh Miranda will continue to follow.  I was asked to do H&P and place initial admission orders.  Full code.    Prior medical records reviewed by me.     Past Medical History  As above.,  Failure to thrive, HLD    Surgical History  Breast augmentation     Social History  Former smoker, no drug use, no alcohol use    Family History  Uterine cancer, hypertension     Allergies  NKDA/NKA    Review  "of Systems   Unable to perform ROS: Mental status change        Physical Exam  Constitutional:       Appearance: She is ill-appearing.   HENT:      Mouth/Throat:      Mouth: Mucous membranes are dry.   Cardiovascular:      Rate and Rhythm: Tachycardia present.   Pulmonary:      Effort: Tachypnea present.      Breath sounds: Decreased breath sounds and rhonchi present.   Abdominal:      General: Bowel sounds are normal.   Musculoskeletal:      Cervical back: Normal range of motion.   Skin:     General: Skin is warm and dry.      Capillary Refill: Capillary refill takes less than 2 seconds.   Neurological:      Mental Status: She is disoriented.          Last Recorded Vitals  Blood pressure 96/55, pulse 97, temperature 36.6 °C (97.9 °F), temperature source Temporal, resp. rate 24, height 1.575 m (5' 2\"), weight 52.2 kg (115 lb), SpO2 95%.    Relevant Results  CT head wo IV contrast  Result Date: 6/18/2025  Interpreted By:  Rishi Burch, STUDY: CT HEAD WO IV CONTRAST;  6/18/2025 1:19 am   INDICATION: Signs/Symptoms:change in ms. History of lung cancer per ER note.     COMPARISON: None.   ACCESSION NUMBER(S): [id number]   ORDERING CLINICIAN: JOSE DE JESUS JADE   TECHNIQUE: Noncontrast axial CT images of head were obtained with coronal and sagittal reconstructed images.   FINDINGS: BRAIN PARENCHYMA: There are multiple supratentorial and infratentorial intra-axial cystic lesions in both cerebral hemispheres and in the left cerebellar hemisphere. In for example, there is a 1.8 cm x 2.2 cm left cerebellar cystic lesion with mural nodule measuring 1.1 cm x 1.2 cm. There is also a 1.8 x 1.8 cm lesion in the left upper vermis with punctate calcifications. The largest supratentorial cystic lesion in the right centrum semi ovale measures 2 cm x 1.9 cm. Additional lesions are annotated on series 202, of which there are nearly a dozen. These lesions demonstrate minimal or no surrounding vasogenic edema. There is no global " intracranial mass-effect such as midline shift or basal cistern effacement. There are also multiple bilateral supratentorial calcifications not associated with cystic lesions, such as within the periventricular white matter of right frontal lobe, in the parasagittal bilateral parietal lobes, along the body of the left lateral ventricle in the centrum semi ovale. No juan full evidence of acute large territory ischemic infarct. No evidence of acute intraparenchymal hemorrhage.   VENTRICLES and EXTRA-AXIAL SPACES: There is a 0.7 cm x 0.3 cm lesion in the anterior horn of the left lateral ventricle. No acute extra-axial or intraventricular hemorrhage. No effacement of cerebral sulci. The ventricles and sulci are age-concordant.   PARANASAL SINUSES/MASTOIDS:  No hemorrhage or air-fluid levels within the visualized paranasal sinuses. The mastoids are well aerated.   CALVARIUM/ORBITS:  No acute skull fracture.  The orbits and globes are intact to the extent visualized.   EXTRACRANIAL SOFT TISSUES: No discernible acute abnormality.       1. Over a dozen cystic lesions intra-axial involving supratentorial and infratentorial compartments, some with associated calcifications and mural nodularity and some purely cystic and most demonstrate minimal or no surrounding vasogenic edema. In setting of known metastatic cancer these are most likely metastatic lesions. The largest in the infant internal fossa measures 2.2 cm x 1.8 cm with mural nodule in the largest in the supratentorial compartment measures 2 cm in the right centrum semi ovale. There is also a 0.7 cm intraventricular lesion in the anterior horn of the left lateral ventricle.   2. Numerous subcentimeter parenchymal calcifications in the cerebral hemispheres not associated with cystic lesions and most likely reflect areas of treated metastatic disease though could also be seen in the setting of prior intracranial fungal or parasitic infection.   3. No acute intracranial  hemorrhage, mass effect, or evidence of acute large territory ischemic infarct.   MACRO: None.   Signed by: Rishi Burch 6/18/2025 1:46 AM Dictation workstation:   [id number]    XR chest 1 view  Result Date: 6/18/2025  STUDY: Chest Radiograph;  6/18/2025  00:058 INDICATION: Weak. COMPARISON: None Available ACCESSION NUMBER(S): [id number] ORDERING CLINICIAN: JOSE DE JESUS JADE TECHNIQUE:  Frontal chest was obtained at 00:08 hours. FINDINGS: CARDIOMEDIASTINAL SILHOUETTE: Cardiomediastinal silhouette is mildly enlarged in size and configuration.  LUNGS: Airspace disease is seen in the left mid and lower lung zone. Diminished inspiration is noted with an elevated right hemidiaphragm and patchy airspace opacity in the right lung.  ABDOMEN: No remarkable upper abdominal findings.  BONES: No acute osseous changes.  Generalized osseous demineralization is noted.    Bilateral airspace disease, left greater than right, statistically multilobar pneumonia in the upper part critical setting. Signed by Rakesh Hoffman    CT cervical spine wo IV contrast  Result Date: 6/3/2025  * * *Final Report* * * DATE OF EXAM: Tino  3 2025  6:06PM   [facility]   [id number]  -  CT CERVICAL SPINE WO IVCON  / ACCESSION #  [id number] PROCEDURE REASON: Spine fracture, cervical, traumatic      * * * * Physician Interpretation * * * *  EXAMINATION:  CT BRAIN WO IVCON, CT CERVICAL SPINE WO IVCON HISTORY:  Head trauma, moderate-severe TECHNIQUE:  Serial axial images without IV were obtained from the vertex to the foramen magnum. M: CTBWO_3 CT of the cervical spine without IV contrast.   Spiral, high resolution axial images were obtained from the skull base to the cervicothoracic junction with sagittal and coronal planar reconstructions. M: CTCPWO_4 CT Dose-Length Product (DLP): 1283  mGy*cm CT Dose Reduction Employed: No dose reduction techniques were required COMPARISON:  Brain MRI 03/11/2025..  Head CT 06/03/2025.  Head CT 09/10/2024. RESULT: HEAD CT FINDINGS:  Post-operative change:  None. Acute change:   No evidence of an acute intracranial process. Hemorrhage:    No evidence of acute intracranial hemorrhage. Mass Lesion / Mass Effect: There has been no significant interval change in size of multiple metastatic disease in the supratentorial and infratentorial brain when compared to most recent prior head CT from 06/03/2025.  There are unchanged dominant lesions in the right frontal lobe and the left cerebellum with mild surrounding vasogenic edema and associated mild local mass effect without midline shift or herniation.   Several lesions are hypodense, which may be of hemorrhagic or calcification, unchanged. Chronic change:   None apparent. Parenchyma:  Mild generalized volume loss.  The brain parenchyma is otherwise within normal limits for age. Ventricles:   Ventricular enlargement concordant with the degree of parenchymal volume loss. Paranasal sinuses and skull base:  The visualized paranasal sinuses and mastoid air cells are clear.  There is unchanged 16 mm right frontal bone sclerotic lesion, likely representing metastatic disease..  The skull base and imaged soft tissues are unremarkable.  (topogram) images: No significant findings. CERVICAL CT FINDINGS: Counting reference:  Craniocervical junction. Alignment:    Alignment is anatomic. Craniocervical junction:    Craniocervical junction is normal. Osseous structures/fracture:  There is an unchanged 8 mm sclerotic lesion in the T1 vertebral body and a confluent lesion in the T3 vertebral body extending into the left pedicle, likely metastatic disease.  No evidence of a lytic or blastic process in the visualized spine.  There is unchanged mild to moderate anterior wedge compression deformity at C5.   Otherwise no evidence of acute or chronic fracture. Cervical soft tissues:   There is multinodular goiter.  The paraspinal soft tissues are within normal limits. Degenerative changes: Multilevel degenerative  changes of cervical spine, most pronounced at C6-C7 with mild left neural foraminal narrowing and at C7-T1 with moderate bilateral neural foraminal narrowing.  There is bony fusion of the C3-C4 vertebral body with severe loss of intervertebral disc space height at C4-C5, C5-C6, C6-C7, C7-T1 and T1-T2..  (topogram) images: No significant findings.    IMPRESSION: No acute intracranial hemorrhage or calvarial fracture. No acute cervical spine fracture or dislocation. Unchanged multiple metastatic disease in the supratentorial and infratentorial brain when compared to most recent prior head CT from 06/03/2025.  Associated mild local mass effect without midline shift or herniation. Unchanged sclerotic lesions in the right frontal bone, T1 vertebral body and T3 vertebral body, likely metastatic disease. : [facility]   Transcribe Date/Time: Tino  3 2025  6:14P Dictated by : YARELY CRUMP MD This examination was interpreted and the report reviewed and electronically signed by: YARELY CRUMP MD on Tino  3 2025  6:30PM  EST    CT head wo IV contrast  Result Date: 6/3/2025  * * *Final Report* * * DATE OF EXAM: Tino  3 2025  6:06PM   [facility]   [id number]  -  CT BRAIN WO IVCON   / ACCESSION #  [id number] PROCEDURE REASON: Head trauma, moderate-severe      * * * * Physician Interpretation * * * *  EXAMINATION:  CT BRAIN WO IVCON, CT CERVICAL SPINE WO IVCON HISTORY:  Head trauma, moderate-severe TECHNIQUE:  Serial axial images without IV were obtained from the vertex to the foramen magnum. M: CTBWO_3 CT of the cervical spine without IV contrast.   Spiral, high resolution axial images were obtained from the skull base to the cervicothoracic junction with sagittal and coronal planar reconstructions. M: CTCPWO_4 CT Dose-Length Product (DLP): 1283  mGy*cm CT Dose Reduction Employed: No dose reduction techniques were required COMPARISON:  Brain MRI 03/11/2025..  Head CT 06/03/2025.  Head CT 09/10/2024. RESULT: HEAD CT FINDINGS:  changes of cervical spine, most pronounced at C6-C7 with mild left neural foraminal narrowing and at C7-T1 with moderate bilateral neural foraminal narrowing.  There is bony fusion of the C3-C4 vertebral body with severe loss of intervertebral disc space height at C4-C5, C5-C6, C6-C7, C7-T1 and T1-T2..  (topogram) images: No significant findings.    IMPRESSION: No acute intracranial hemorrhage or calvarial fracture. No acute cervical spine fracture or dislocation. Unchanged multiple metastatic disease in the supratentorial and infratentorial brain when compared to most recent prior head CT from 06/03/2025.  Associated mild local mass effect without midline shift or herniation. Unchanged sclerotic lesions in the right frontal bone, T1 vertebral body and T3 vertebral body, likely metastatic disease. : [facility]   Transcribe Date/Time: Tino  3 2025  6:14P Dictated by : YARELY CRUMP MD This examination was interpreted and the report reviewed and electronically signed by: YARELY CRUMP MD on Tino  3 2025  6:30PM  EST    Results for orders placed or performed during the hospital encounter of 06/17/25 (from the past 24 hours)   POCT GLUCOSE   Result Value Ref Range    POCT Glucose 276 (H) 74 - 99 mg/dL   Lactate   Result Value Ref Range    Lactate 2.6 (H) 0.4 - 2.0 mmol/L   POCT GLUCOSE   Result Value Ref Range    POCT Glucose 205 (H) 74 - 99 mg/dL   POCT GLUCOSE   Result Value Ref Range    POCT Glucose 228 (H) 74 - 99 mg/dL   Lactate   Result Value Ref Range    Lactate 2.7 (H) 0.4 - 2.0 mmol/L   Lactate   Result Value Ref Range    Lactate 4.3 (HH) 0.4 - 2.0 mmol/L   POCT GLUCOSE   Result Value Ref Range    POCT Glucose 120 (H) 74 - 99 mg/dL   POCT GLUCOSE   Result Value Ref Range    POCT Glucose 126 (H) 74 - 99 mg/dL   POCT GLUCOSE   Result Value Ref Range    POCT Glucose 146 (H) 74 - 99 mg/dL   CBC   Result Value Ref Range    WBC 8.3 4.4 - 11.3 x10*3/uL    nRBC 0.6 (H) 0.0 - 0.0 /100 WBCs    RBC 3.58  (L) 4.00 - 5.20 x10*6/uL    Hemoglobin 10.3 (L) 12.0 - 16.0 g/dL    Hematocrit 35.4 (L) 36.0 - 46.0 %    MCV 99 80 - 100 fL    MCH 28.8 26.0 - 34.0 pg    MCHC 29.1 (L) 32.0 - 36.0 g/dL    RDW 16.9 (H) 11.5 - 14.5 %    Platelets 155 150 - 450 x10*3/uL   Basic Metabolic Panel   Result Value Ref Range    Glucose 145 (H) 74 - 99 mg/dL    Sodium 141 136 - 145 mmol/L    Potassium 5.6 (H) 3.5 - 5.3 mmol/L    Chloride 110 (H) 98 - 107 mmol/L    Bicarbonate 17 (L) 21 - 32 mmol/L    Anion Gap 20 10 - 20 mmol/L    Urea Nitrogen 62 (H) 6 - 23 mg/dL    Creatinine 2.21 (H) 0.50 - 1.05 mg/dL    eGFR 23 (L) >60 mL/min/1.73m*2    Calcium 8.9 8.6 - 10.3 mg/dL   Lactate   Result Value Ref Range    Lactate 3.2 (H) 0.4 - 2.0 mmol/L       Assessment & Plan  Multifocal pneumonia    Acute renal failure    Acute cystitis    Acute metabolic encephalopathy    Hyperglycemia      Verna is a 75-year-old female patient with past medical history significant for brain, liver, and bone cancer with metastasis, hypertension, diabetes, failure to thrive, dementia, and hyperlipidemia presenting to the ED for evaluation of altered mental status and dehydration.  Patient reportedly had a recent admission to [facility] for a UTI, dehydration, and an acute kidney injury.  Patient found to have a lactate of 2.9 with a repeat of 2.9.  Urinalysis positive for infection with urine culture pending.  Glucose 260, potassium 5.5, BUN 57, creatinine 2.39, GFR 21, alk phos 628, ammonia 54, , ALT 89.  Blood cultures obtained x 2 and pending.  Vital signs within normal limits, patient on supplemental oxygen.  Imaging consistent with metastatic cancer showing multilobar pneumonia.  Patient started on IV vancomycin and Zosyn, will remain n.p.o., provided IV maintenance fluids and admitted for further medical management.    Multifocal pneumonia/acute renal failure/acute cystitis/acute metabolic encephalopathy/hyperglycemia  Inpatient admission to SDU per   Adarsh Miranda  Full code  See imaging results above  Legionella/strep urine  Blood cultures x 2  Trend lactate  DuoNeb treatments per RT  RT evaluation  Tylenol suppository as needed  N.p.o. until passed swallow evaluation  Bedside swallow evaluation  Continue IV vancomycin and Zosyn  NS x 1 L bolus in ED  Continue IV maintenance fluids  Insulin sliding scale  Repeat labs    Dementia/HLD/HTN/DM/metastatic cancer  #Chronic conditions  Nursing to complete home med rec  Patient n.p.o. until passed swallow evaluation  Monitor blood pressure  Insulin sliding scale for n.p.o.    DVT Ppx  SCDs  Heparin subcutaneous  Bedrest      I spent 55 minutes in the professional and overall care of this patient.  Adarsh Miranda, DO

## 2025-06-19 NOTE — CARE PLAN
RN notified NP that patient's son, Zi, would like code status changed to DNR-CCA and would also like patient to go [facility]. Code status changed in computer. Social work consult placed. Attending to follow.

## 2025-06-19 NOTE — CARE PLAN
RN notified NP that patient's lactic acid went from 2.7 to 4.3. Blood cultures completed in ED. patient is on zosyn and LR. Continue treatment. Trend lactic. Attending to follow.

## 2025-06-20 NOTE — PROGRESS NOTES
Verna Montana is a 75 y.o. female on day 2 of admission presenting with Multifocal pneumonia.      Subjective   - Per resident- significant event-  I was called to the bedside to pronounce that patient in room 812 had .  No spontaneous movements were present.  There was no response to verbal or tactile stimuli.  Pupils were dilated and fixed.  Corneal reflex absent.  No breath sounds were auscultated.  No carotid pulse was palpable.  No heart sounds were auscultated over entire precordium. Patient pronounced at 17:30 on 25.     Patient discharged - Family aware, condolences given.             Objective     Last Recorded Vitals  BP 89/53   Pulse 103   Temp 36.5 °C (97.7 °F)   Resp 22   Wt 52.2 kg (115 lb)   SpO2 90%   Intake/Output last 3 Shifts:    Intake/Output Summary (Last 24 hours) at 2025 121  Last data filed at 2025 1951  Gross per 24 hour   Intake 1140 ml   Output 500 ml   Net 640 ml       Admission Weight  Weight: 52.2 kg (115 lb) (25 0222)    Daily Weight  25 : 52.2 kg (115 lb)    Image Results  CT head wo IV contrast  Narrative: Interpreted By:  Rishi Burch,   STUDY:  CT HEAD WO IV CONTRAST;  2025 1:19 am      INDICATION:  Signs/Symptoms:change in ms. History of lung cancer per ER note.          COMPARISON:  None.      ACCESSION NUMBER(S):  [id number]      ORDERING CLINICIAN:  JOSE DE JESUS JADE      TECHNIQUE:  Noncontrast axial CT images of head were obtained with coronal and  sagittal reconstructed images.      FINDINGS:  BRAIN PARENCHYMA: There are multiple supratentorial and  infratentorial intra-axial cystic lesions in both cerebral  hemispheres and in the left cerebellar hemisphere. In for example,  there is a 1.8 cm x 2.2 cm left cerebellar cystic lesion with mural  nodule measuring 1.1 cm x 1.2 cm. There is also a 1.8 x 1.8 cm lesion  in the left upper vermis with punctate calcifications. The largest  supratentorial cystic lesion in the  right centrum semi ovale measures  2 cm x 1.9 cm. Additional lesions are annotated on series 202, of  which there are nearly a dozen. These lesions demonstrate minimal or  no surrounding vasogenic edema. There is no global intracranial  mass-effect such as midline shift or basal cistern effacement. There  are also multiple bilateral supratentorial calcifications not  associated with cystic lesions, such as within the periventricular  white matter of right frontal lobe, in the parasagittal bilateral  parietal lobes, along the body of the left lateral ventricle in the  centrum semi ovale. No juan full evidence of acute large territory  ischemic infarct. No evidence of acute intraparenchymal hemorrhage.      VENTRICLES and EXTRA-AXIAL SPACES: There is a 0.7 cm x 0.3 cm lesion  in the anterior horn of the left lateral ventricle. No acute  extra-axial or intraventricular hemorrhage. No effacement of cerebral  sulci. The ventricles and sulci are age-concordant.      PARANASAL SINUSES/MASTOIDS:  No hemorrhage or air-fluid levels within  the visualized paranasal sinuses. The mastoids are well aerated.      CALVARIUM/ORBITS:  No acute skull fracture.  The orbits and globes  are intact to the extent visualized.      EXTRACRANIAL SOFT TISSUES: No discernible acute abnormality.      Impression: 1. Over a dozen cystic lesions intra-axial involving supratentorial  and infratentorial compartments, some with associated calcifications  and mural nodularity and some purely cystic and most demonstrate  minimal or no surrounding vasogenic edema. In setting of known  metastatic cancer these are most likely metastatic lesions. The  largest in the infant internal fossa measures 2.2 cm x 1.8 cm with  mural nodule in the largest in the supratentorial compartment  measures 2 cm in the right centrum semi ovale. There is also a 0.7 cm  intraventricular lesion in the anterior horn of the left lateral  ventricle.      2. Numerous subcentimeter  parenchymal calcifications in the cerebral  hemispheres not associated with cystic lesions and most likely  reflect areas of treated metastatic disease though could also be seen  in the setting of prior intracranial fungal or parasitic infection.      3. No acute intracranial hemorrhage, mass effect, or evidence of  acute large territory ischemic infarct.      MACRO:  None.      Signed by: Rishi Burch 6/18/2025 1:46 AM  Dictation workstation:   [id number]  XR chest 1 view  Narrative: STUDY:  Chest Radiograph;  6/18/2025  00:058  INDICATION:  Weak.  COMPARISON:  None Available  ACCESSION NUMBER(S):  [id number]  ORDERING CLINICIAN:  JOSE DE JESUS JADE  TECHNIQUE:  Frontal chest was obtained at 00:08 hours.  FINDINGS:  CARDIOMEDIASTINAL SILHOUETTE:  Cardiomediastinal silhouette is mildly enlarged in size and  configuration.     LUNGS:  Airspace disease is seen in the left mid and lower lung zone.   Diminished inspiration is noted with an elevated right hemidiaphragm  and patchy airspace opacity in the right lung.     ABDOMEN:  No remarkable upper abdominal findings.     BONES:  No acute osseous changes.  Generalized osseous demineralization is  noted.  Impression: Bilateral airspace disease, left greater than right, statistically  multilobar pneumonia in the upper part critical setting.  Signed by Rakesh Hoffman      Physical Exam  Constitutional:       Appearance: She is ill-appearing.   HENT:      Mouth/Throat:      Mouth: Mucous membranes are dry.   Cardiovascular:      Rate and Rhythm: Tachycardia present.   Pulmonary:      Effort: Tachypnea present.      Breath sounds: Decreased breath sounds and rhonchi present.   Abdominal:      General: Bowel sounds are normal.   Musculoskeletal:      Cervical back: Normal range of motion.   Skin:     General: Skin is warm and dry.      Capillary Refill: Capillary refill takes less than 2 seconds.   Neurological:      Mental Status: She is disoriented.      Relevant  Results               This patient currently has cardiac telemetry ordered; if you would like to modify or discontinue the telemetry order, click here to go to the orders activity to modify/discontinue the order.              Assessment & Plan  Multifocal pneumonia    Acute renal failure    Acute cystitis    Acute metabolic encephalopathy    Hyperglycemia    Verna is a 75-year-old female patient with past medical history significant for brain, liver, and bone cancer with metastasis, hypertension, diabetes, failure to thrive, dementia, and hyperlipidemia presenting to the ED for evaluation of altered mental status and dehydration.  Patient reportedly had a recent admission to [facility] for a UTI, dehydration, and an acute kidney injury.  Patient found to have a lactate of 2.9 with a repeat of 2.9.  Urinalysis positive for infection with urine culture pending.  Glucose 260, potassium 5.5, BUN 57, creatinine 2.39, GFR 21, alk phos 628, ammonia 54, , ALT 89.  Blood cultures obtained x 2 and pending.  Vital signs within normal limits, patient on supplemental oxygen.  Imaging consistent with metastatic cancer showing multilobar pneumonia.  Patient started on IV vancomycin and Zosyn, will remain n.p.o., provided IV maintenance fluids and admitted for further medical management.     Multifocal pneumonia/acute renal failure/acute cystitis/acute metabolic encephalopathy/hyperglycemia  Inpatient admission to SDU per Dr. Adarsh Miranda  Full code  See imaging results above  Legionella/strep urine  Blood cultures x 2  Trend lactate  DuoNeb treatments per RT  RT evaluation  Tylenol suppository as needed  N.p.o. until passed swallow evaluation  Bedside swallow evaluation  Continue IV vancomycin and Zosyn  NS x 1 L bolus in ED  Continue IV maintenance fluids  Insulin sliding scale  Repeat labs     Dementia/HLD/HTN/DM/metastatic cancer  #Chronic conditions  Nursing to complete home med rec  Patient n.p.o. until passed  swallow evaluation  Monitor blood pressure  Insulin sliding scale for n.p.o.     DVT Ppx  SCDs  Heparin subcutaneous  Bedrest     : Family has elected for hospice. Meeting with [facility] tomorrow.  Patient fully evaluated  for   Assessment & Plan  Multifocal pneumonia    Acute renal failure    Acute cystitis    Acute metabolic encephalopathy    Hyperglycemia    Patient  25 @ 17:30    Prior Vital signs for last 24 hours:  Temp:  [37.2 °C (99 °F)] 37.2 °C (99 °F)  Heart Rate:  [] 44  Resp:  [22] 22  BP: (47-87)/(23-51) 47/23 Medications and labs reviewed-   Results for orders placed or performed during the hospital encounter of 25 (from the past 24 hours)   POCT GLUCOSE   Result Value Ref Range    POCT Glucose 149 (H) 74 - 99 mg/dL   POCT GLUCOSE   Result Value Ref Range    POCT Glucose 148 (H) 74 - 99 mg/dL      Patient recently received an antibiotic (last 12 hours)       None           No results found for the last 90 days.            I spent 60 minutes on the date of the service which included preparing to see the patient, face-to-face patient care, completing clinical documentation, obtaining and/or reviewing separately obtained history, performing a medically appropriate examination, counseling and educating the patient/family/caregiver, ordering medications, tests, or procedures, communicating with other HCPs (not separately reported), independently interpreting results (not separately reported), communicating results to the patient/family/caregiver, and care coordination (not separately reported         Trena Shannon

## 2025-06-20 NOTE — PROGRESS NOTES
Pt's family are having the [facility] meeing at 9:30am today. Sw to follow up to see how the meeting went and what the next steps are. Sw will continue to provide support and services as needed.     10:37am- Sw met with hospice after the meeting. The family wants a day to decide if they would like to sign consents for pt to be GIP here.  Another meeting will be set up for tomorrow.  The long term plan would be for pt to transition to [facility] with Hosp[facility] ([facility]).  Sw will continue to provide support and services as needed.

## 2025-06-20 NOTE — DOCUMENTATION CLARIFICATION NOTE
"    PATIENT:               COOPER DRAPER  ACCT #:                  [id number]  MRN:                       [id number]  :                       1950  ADMIT DATE:       2025 11:32 PM  DISCH DATE:  RESPONDING PROVIDER #:        [id number]          PROVIDER RESPONSE TEXT:    Sepsis with multi-system organ dysfunction of renal organ dysfunction of Acute Renal Failure, neurological organ dysfunction of Metabolic Encephalopathy, metabolic organ dysfunction of Lactic Acidosis    CDI QUERY TEXT:    Clarification    Instruction:  Based on your assessment of the patient and the clinical information, please provide the requested documentation by clicking on the appropriate radio button and enter any additional information if prompted.    Question: Is there a diagnosis indicative of a patient meeting SIRS criteria and with organ dysfunction in the setting of Pneumonia and Cystitis    When answering this query, please exercise your independent professional judgment. The fact that a question is being asked, does not imply that any particular answer is desired or expected.    The patient's clinical indicators include:  Clinical Information: 75 year old female presents from [address] [place] [address] for a complaint of change in mental status along with dehydration and pneumonia    Clinical Indicators:  ED Notes: \"PMH of EGFR positive NSCLC with brain , liver and bone  mets (s/p Osimertinib,Pemetrexed, Carboplatin), HTN, multinodular goitre, and DM  They also reported that the patient recently had a hospital admission at the [facility] for a UTI, dehydration and an acute kidney injury  She is ill-appearing and toxic-appearing.  She is disoriented.\"     H&P: \"Chest x-ray completed showing multilobar pneumonia.  CT of the head and C-spine completed showing multiple metastatic disease as well as concerning questionable fungal or parasitic infection per radiology review.  Blood pressure 106/58, heart rate 113, " "respirations 24, temperature 36.8 ?C, SpO2 98% on supplemental oxygen.  Multifocal pneumonia  Acute renal failure  Acute cystitis  Acute metabolic encephalopathy\"    Labs: 6/17 BUN 46, Cr 1.59  6/18 BUN 57, Cr 2.39, Lactate 2.6-4.3  6/18 Blood Cx X2 (pending)  6/19 BUN 62, Cr 2.21, Hgb 10.3, Lactate 2.3    Vital Signs: 6/17 @ 2335 (arrival) , /64, SPO2 100  6/18 @ 0000 , Resp 39, SPO2 98%  6/18 @ 1230 , Resp 24, /62, SPO2 96% with oxygen    Treatment: Medications: LR IV at 75ml/hr, IV Zosyn 2.25g q6h, Blood Cultures, CXR, monitoring labs, oxygen  Completed or D/C'd Meds: IV Zosyn 4.5g IV x1, NS IV 1000ml bolus x 2, IV Vanco 1g x1 dose    Risk Factors: From nursing facility, recent hospital admission. cancer with mets, diabetes, failure to thrive, cystitis, pneumonia  Options provided:  -- Sepsis with multi-system organ dysfunction of renal organ dysfunction of Acute Renal Failure, neurological organ dysfunction of Metabolic Encephalopathy, metabolic organ dysfunction of Lactic Acidosis  -- Sepsis with renal organ dysfunction of Acute Renal Failure  -- Sepsis with neurological organ dysfunction of Metabolic Encephalopathy  -- Sepsis with metabolic organ dysfunction of Lactic Acidosis  -- Other - I will add my own diagnosis  -- Refer to Clinical Documentation Reviewer    Query created by: Catrachita Whitmore on 6/19/2025 11:01 AM      Electronically signed by:  MARKO NARVAEZ DO 6/19/2025 10:31 PM          "

## 2025-06-20 NOTE — PROGRESS NOTES
Verna Montana is a 75 y.o. female on day 1 of admission presenting with Multifocal pneumonia.      Subjective   No events overnight. Patient seen and examined at bedside. She is unable to participate.        Objective     Last Recorded Vitals  BP 98/56   Pulse 108   Temp 36.8 °C (98.2 °F)   Resp 22   Wt 52.2 kg (115 lb)   SpO2 91%   Intake/Output last 3 Shifts:    Intake/Output Summary (Last 24 hours) at 6/19/2025 2216  Last data filed at 6/19/2025 1951  Gross per 24 hour   Intake 1140 ml   Output 500 ml   Net 640 ml       Admission Weight  Weight: 52.2 kg (115 lb) (06/18/25 0222)    Daily Weight  06/18/25 : 52.2 kg (115 lb)    Image Results  CT head wo IV contrast  Narrative: Interpreted By:  Rishi Burch,   STUDY:  CT HEAD WO IV CONTRAST;  6/18/2025 1:19 am      INDICATION:  Signs/Symptoms:change in ms. History of lung cancer per ER note.          COMPARISON:  None.      ACCESSION NUMBER(S):  [id number]      ORDERING CLINICIAN:  JOSE DE JESUS JADE      TECHNIQUE:  Noncontrast axial CT images of head were obtained with coronal and  sagittal reconstructed images.      FINDINGS:  BRAIN PARENCHYMA: There are multiple supratentorial and  infratentorial intra-axial cystic lesions in both cerebral  hemispheres and in the left cerebellar hemisphere. In for example,  there is a 1.8 cm x 2.2 cm left cerebellar cystic lesion with mural  nodule measuring 1.1 cm x 1.2 cm. There is also a 1.8 x 1.8 cm lesion  in the left upper vermis with punctate calcifications. The largest  supratentorial cystic lesion in the right centrum semi ovale measures  2 cm x 1.9 cm. Additional lesions are annotated on series 202, of  which there are nearly a dozen. These lesions demonstrate minimal or  no surrounding vasogenic edema. There is no global intracranial  mass-effect such as midline shift or basal cistern effacement. There  are also multiple bilateral supratentorial calcifications not  associated with cystic lesions, such as within  the periventricular  white matter of right frontal lobe, in the parasagittal bilateral  parietal lobes, along the body of the left lateral ventricle in the  centrum semi ovale. No juan full evidence of acute large territory  ischemic infarct. No evidence of acute intraparenchymal hemorrhage.      VENTRICLES and EXTRA-AXIAL SPACES: There is a 0.7 cm x 0.3 cm lesion  in the anterior horn of the left lateral ventricle. No acute  extra-axial or intraventricular hemorrhage. No effacement of cerebral  sulci. The ventricles and sulci are age-concordant.      PARANASAL SINUSES/MASTOIDS:  No hemorrhage or air-fluid levels within  the visualized paranasal sinuses. The mastoids are well aerated.      CALVARIUM/ORBITS:  No acute skull fracture.  The orbits and globes  are intact to the extent visualized.      EXTRACRANIAL SOFT TISSUES: No discernible acute abnormality.      Impression: 1. Over a dozen cystic lesions intra-axial involving supratentorial  and infratentorial compartments, some with associated calcifications  and mural nodularity and some purely cystic and most demonstrate  minimal or no surrounding vasogenic edema. In setting of known  metastatic cancer these are most likely metastatic lesions. The  largest in the infant internal fossa measures 2.2 cm x 1.8 cm with  mural nodule in the largest in the supratentorial compartment  measures 2 cm in the right centrum semi ovale. There is also a 0.7 cm  intraventricular lesion in the anterior horn of the left lateral  ventricle.      2. Numerous subcentimeter parenchymal calcifications in the cerebral  hemispheres not associated with cystic lesions and most likely  reflect areas of treated metastatic disease though could also be seen  in the setting of prior intracranial fungal or parasitic infection.      3. No acute intracranial hemorrhage, mass effect, or evidence of  acute large territory ischemic infarct.      MACRO:  None.      Signed by: Rishi Burch  6/18/2025 1:46 AM  Dictation workstation:   [id number]  XR chest 1 view  Narrative: STUDY:  Chest Radiograph;  6/18/2025  00:058  INDICATION:  Weak.  COMPARISON:  None Available  ACCESSION NUMBER(S):  [id number]  ORDERING CLINICIAN:  JOSE DE JESUS JADE  TECHNIQUE:  Frontal chest was obtained at 00:08 hours.  FINDINGS:  CARDIOMEDIASTINAL SILHOUETTE:  Cardiomediastinal silhouette is mildly enlarged in size and  configuration.     LUNGS:  Airspace disease is seen in the left mid and lower lung zone.   Diminished inspiration is noted with an elevated right hemidiaphragm  and patchy airspace opacity in the right lung.     ABDOMEN:  No remarkable upper abdominal findings.     BONES:  No acute osseous changes.  Generalized osseous demineralization is  noted.  Impression: Bilateral airspace disease, left greater than right, statistically  multilobar pneumonia in the upper part critical setting.  Signed by Rakesh Hoffman      Physical Exam  Constitutional:       Appearance: She is ill-appearing.   HENT:      Mouth/Throat:      Mouth: Mucous membranes are dry.   Cardiovascular:      Rate and Rhythm: Tachycardia present.   Pulmonary:      Effort: Tachypnea present.      Breath sounds: Decreased breath sounds and rhonchi present.   Abdominal:      General: Bowel sounds are normal.   Musculoskeletal:      Cervical back: Normal range of motion.   Skin:     General: Skin is warm and dry.      Capillary Refill: Capillary refill takes less than 2 seconds.   Neurological:      Mental Status: She is disoriented.      Relevant Results               This patient currently has cardiac telemetry ordered; if you would like to modify or discontinue the telemetry order, click here to go to the orders activity to modify/discontinue the order.              Assessment & Plan  Multifocal pneumonia    Acute renal failure    Acute cystitis    Acute metabolic encephalopathy    Hyperglycemia    Verna is a 75-year-old female patient with past medical  history significant for brain, liver, and bone cancer with metastasis, hypertension, diabetes, failure to thrive, dementia, and hyperlipidemia presenting to the ED for evaluation of altered mental status and dehydration.  Patient reportedly had a recent admission to [facility] for a UTI, dehydration, and an acute kidney injury.  Patient found to have a lactate of 2.9 with a repeat of 2.9.  Urinalysis positive for infection with urine culture pending.  Glucose 260, potassium 5.5, BUN 57, creatinine 2.39, GFR 21, alk phos 628, ammonia 54, , ALT 89.  Blood cultures obtained x 2 and pending.  Vital signs within normal limits, patient on supplemental oxygen.  Imaging consistent with metastatic cancer showing multilobar pneumonia.  Patient started on IV vancomycin and Zosyn, will remain n.p.o., provided IV maintenance fluids and admitted for further medical management.     Multifocal pneumonia/acute renal failure/acute cystitis/acute metabolic encephalopathy/hyperglycemia  Inpatient admission to SDU per Dr. Adarsh Miranda  Full code  See imaging results above  Legionella/strep urine  Blood cultures x 2  Trend lactate  DuoNeb treatments per RT  RT evaluation  Tylenol suppository as needed  N.p.o. until passed swallow evaluation  Bedside swallow evaluation  Continue IV vancomycin and Zosyn  NS x 1 L bolus in ED  Continue IV maintenance fluids  Insulin sliding scale  Repeat labs     Dementia/HLD/HTN/DM/metastatic cancer  #Chronic conditions  Nursing to complete home med rec  Patient n.p.o. until passed swallow evaluation  Monitor blood pressure  Insulin sliding scale for n.p.o.     DVT Ppx  SCDs  Heparin subcutaneous  Bedrest     6/19: Family has elected for hospice. Meeting with [facility] tomorrow.           Adarsh Miranda DO

## 2025-06-20 NOTE — PROGRESS NOTES
Verna Montana is a 75 y.o. female on day 2 of admission presenting with Multifocal pneumonia.      Subjective   06/20- patient medically complex, acute on chronic with guarded outcome. Per long discussion with family and care team on goals of care and meeting with hospice today- [facility] family wishes to further discuss amongst themselves possible GIP vs return to [facility] with hospice. Patient seen and fully examined at bedside, patient ill appearing, acutely complex, marked decline from baseline. No acute distress noted, appears comfortable, continue comfort measures.  Will continue to monitor overnight. Long discussion on goals of care with patient and family, will continue current and await final decision. Patient/family reports: no new complaints. Patient seen and examined at bedside. She is unable to participate. No events overnight.       Objective     Last Recorded Vitals  BP 89/53   Pulse 103   Temp 36.5 °C (97.7 °F)   Resp 22   Wt 52.2 kg (115 lb)   SpO2 90%   Intake/Output last 3 Shifts:    Intake/Output Summary (Last 24 hours) at 6/20/2025 1213  Last data filed at 6/19/2025 1951  Gross per 24 hour   Intake 1140 ml   Output 500 ml   Net 640 ml       Admission Weight  Weight: 52.2 kg (115 lb) (06/18/25 0222)    Daily Weight  06/18/25 : 52.2 kg (115 lb)    Image Results  CT head wo IV contrast  Narrative: Interpreted By:  Rishi Burch,   STUDY:  CT HEAD WO IV CONTRAST;  6/18/2025 1:19 am      INDICATION:  Signs/Symptoms:change in ms. History of lung cancer per ER note.          COMPARISON:  None.      ACCESSION NUMBER(S):  [id number]      ORDERING CLINICIAN:  JOSE DE JESUS JADE      TECHNIQUE:  Noncontrast axial CT images of head were obtained with coronal and  sagittal reconstructed images.      FINDINGS:  BRAIN PARENCHYMA: There are multiple supratentorial and  infratentorial intra-axial cystic lesions in both cerebral  hemispheres and in the left cerebellar hemisphere. In for  example,  there is a 1.8 cm x 2.2 cm left cerebellar cystic lesion with mural  nodule measuring 1.1 cm x 1.2 cm. There is also a 1.8 x 1.8 cm lesion  in the left upper vermis with punctate calcifications. The largest  supratentorial cystic lesion in the right centrum semi ovale measures  2 cm x 1.9 cm. Additional lesions are annotated on series 202, of  which there are nearly a dozen. These lesions demonstrate minimal or  no surrounding vasogenic edema. There is no global intracranial  mass-effect such as midline shift or basal cistern effacement. There  are also multiple bilateral supratentorial calcifications not  associated with cystic lesions, such as within the periventricular  white matter of right frontal lobe, in the parasagittal bilateral  parietal lobes, along the body of the left lateral ventricle in the  centrum semi ovale. No juan full evidence of acute large territory  ischemic infarct. No evidence of acute intraparenchymal hemorrhage.      VENTRICLES and EXTRA-AXIAL SPACES: There is a 0.7 cm x 0.3 cm lesion  in the anterior horn of the left lateral ventricle. No acute  extra-axial or intraventricular hemorrhage. No effacement of cerebral  sulci. The ventricles and sulci are age-concordant.      PARANASAL SINUSES/MASTOIDS:  No hemorrhage or air-fluid levels within  the visualized paranasal sinuses. The mastoids are well aerated.      CALVARIUM/ORBITS:  No acute skull fracture.  The orbits and globes  are intact to the extent visualized.      EXTRACRANIAL SOFT TISSUES: No discernible acute abnormality.      Impression: 1. Over a dozen cystic lesions intra-axial involving supratentorial  and infratentorial compartments, some with associated calcifications  and mural nodularity and some purely cystic and most demonstrate  minimal or no surrounding vasogenic edema. In setting of known  metastatic cancer these are most likely metastatic lesions. The  largest in the infant internal fossa measures 2.2 cm x  1.8 cm with  mural nodule in the largest in the supratentorial compartment  measures 2 cm in the right centrum semi ovale. There is also a 0.7 cm  intraventricular lesion in the anterior horn of the left lateral  ventricle.      2. Numerous subcentimeter parenchymal calcifications in the cerebral  hemispheres not associated with cystic lesions and most likely  reflect areas of treated metastatic disease though could also be seen  in the setting of prior intracranial fungal or parasitic infection.      3. No acute intracranial hemorrhage, mass effect, or evidence of  acute large territory ischemic infarct.      MACRO:  None.      Signed by: Rishi Burch 6/18/2025 1:46 AM  Dictation workstation:   [id number]  XR chest 1 view  Narrative: STUDY:  Chest Radiograph;  6/18/2025  00:058  INDICATION:  Weak.  COMPARISON:  None Available  ACCESSION NUMBER(S):  [id number]  ORDERING CLINICIAN:  JOSE DE JESUS JADE  TECHNIQUE:  Frontal chest was obtained at 00:08 hours.  FINDINGS:  CARDIOMEDIASTINAL SILHOUETTE:  Cardiomediastinal silhouette is mildly enlarged in size and  configuration.     LUNGS:  Airspace disease is seen in the left mid and lower lung zone.   Diminished inspiration is noted with an elevated right hemidiaphragm  and patchy airspace opacity in the right lung.     ABDOMEN:  No remarkable upper abdominal findings.     BONES:  No acute osseous changes.  Generalized osseous demineralization is  noted.  Impression: Bilateral airspace disease, left greater than right, statistically  multilobar pneumonia in the upper part critical setting.  Signed by Rakesh Hoffman      Physical Exam  Constitutional:       Appearance: She is ill-appearing.   HENT:      Mouth/Throat:      Mouth: Mucous membranes are dry.   Cardiovascular:      Rate and Rhythm: Tachycardia present.   Pulmonary:      Effort: Tachypnea present.      Breath sounds: Decreased breath sounds and rhonchi present.   Abdominal:      General: Bowel sounds are  normal.   Musculoskeletal:      Cervical back: Normal range of motion.   Skin:     General: Skin is warm and dry.      Capillary Refill: Capillary refill takes less than 2 seconds.   Neurological:      Mental Status: She is disoriented.      Relevant Results               This patient currently has cardiac telemetry ordered; if you would like to modify or discontinue the telemetry order, click here to go to the orders activity to modify/discontinue the order.              Assessment & Plan  Multifocal pneumonia    Acute renal failure    Acute cystitis    Acute metabolic encephalopathy    Hyperglycemia    Verna is a 75-year-old female patient with past medical history significant for brain, liver, and bone cancer with metastasis, hypertension, diabetes, failure to thrive, dementia, and hyperlipidemia presenting to the ED for evaluation of altered mental status and dehydration.  Patient reportedly had a recent admission to [facility] for a UTI, dehydration, and an acute kidney injury.  Patient found to have a lactate of 2.9 with a repeat of 2.9.  Urinalysis positive for infection with urine culture pending.  Glucose 260, potassium 5.5, BUN 57, creatinine 2.39, GFR 21, alk phos 628, ammonia 54, , ALT 89.  Blood cultures obtained x 2 and pending.  Vital signs within normal limits, patient on supplemental oxygen.  Imaging consistent with metastatic cancer showing multilobar pneumonia.  Patient started on IV vancomycin and Zosyn, will remain n.p.o., provided IV maintenance fluids and admitted for further medical management.     Multifocal pneumonia/acute renal failure/acute cystitis/acute metabolic encephalopathy/hyperglycemia  Inpatient admission to SDU per Dr. Adarsh Miranda  Full code  See imaging results above  Legionella/strep urine  Blood cultures x 2  Trend lactate  DuoNeb treatments per RT  RT evaluation  Tylenol suppository as needed  N.p.o. until passed swallow evaluation  Bedside swallow  evaluation  Continue IV vancomycin and Zosyn  NS x 1 L bolus in ED  Continue IV maintenance fluids  Insulin sliding scale  Repeat labs     Dementia/HLD/HTN/DM/metastatic cancer  #Chronic conditions  Nursing to complete home med rec  Patient n.p.o. until passed swallow evaluation  Monitor blood pressure  Insulin sliding scale for n.p.o.     DVT Ppx  SCDs  Heparin subcutaneous  Bedrest     6/19: Family has elected for hospice. Meeting with [facility] tomorrow.         General: in no acute distress, appears comfortable, hospice per family, continue comfort measures.  Eyes: PERRLA   HENT: Normo-cephalic, atraumatic.   CV: Tachycardic rate, irregular rhythm.   Resp: Breathing non-labored,  diminished to auscultation bilaterally  GI: BS x4   : monitor intake and output  MSK/Extremities: No gross bony deformities. PT/OT on the case  Skin: Warm. Appropriate color, continue offloading  Neuro:  Face symmetric.   Psych: returning to baseline, improved     10 point ROS negative unless otherwise noted in HPI    Patient fully evaluated 06/20  for    Problem List Items Addressed This Visit       * (Principal) Multifocal pneumonia - Primary    Acute renal failure    Hyperglycemia     Other Visit Diagnoses         Altered mental status, unspecified altered mental status type              Brought to hospital - had concerns including Dehydration.  Patient seen resting in bed with head of bed elevated, no s/s or c/o acute difficulties at this time.  Vital signs for last 24 hours Temp:  [36.5 °C (97.7 °F)-37.1 °C (98.8 °F)] 36.5 °C (97.7 °F)  Heart Rate:  [100-108] 103  Resp:  [20-24] 22  BP: (89-98)/(51-56) 89/53  FiO2 (%):  [36 %] 36 %    No intake/output data recorded.  Patient still requiring frequent cardiac and SPO2 monitoring. Continue aggressive pulmonary hygiene and oral hygiene. Off loading as tolerated for skin integrity. Medications and labs reviewed-   Results for orders placed or performed during the  hospital encounter of 06/17/25 (from the past 24 hours)   POCT GLUCOSE   Result Value Ref Range    POCT Glucose 134 (H) 74 - 99 mg/dL   POCT GLUCOSE   Result Value Ref Range    POCT Glucose 128 (H) 74 - 99 mg/dL   POCT GLUCOSE   Result Value Ref Range    POCT Glucose 143 (H) 74 - 99 mg/dL   POCT GLUCOSE   Result Value Ref Range    POCT Glucose 157 (H) 74 - 99 mg/dL      Patient recently received an antibiotic (last 12 hours)       Date/Time Action Medication Dose    06/20/25 0930 New Bag    piperacillin-tazobactam (Zosyn) 2.25 g in dextrose (iso) IV 50 mL 2.25 g    06/20/25 0255 New Bag    piperacillin-tazobactam (Zosyn) 2.25 g in dextrose (iso) IV 50 mL 2.25 g           Plan discussed with interdisciplinary team, continue current. Discharge planning discussed with patient and care team.   Anticipate - HOSPICE GIP/ Return to facility    Patient aware and agreeable to current plan, continue plan as above.     I spent a total of 60 minutes on the date of the service which included preparing to see the patient, face-to-face patient care, completing clinical documentation, obtaining and/or reviewing separately obtained history, performing a medically appropriate examination, counseling and educating the patient/family/caregiver, ordering medications, tests, or procedures, communicating with other HCPs (not separately reported), independently interpreting results (not separately reported), communicating results to the patient/family/caregiver, and care coordination (not separately reported).     Trena Shannon

## 2025-06-20 NOTE — CARE PLAN
The clinical goals for the shift include comfort      Problem: Skin  Goal: Prevent/manage excess moisture  Outcome: Progressing     Problem: Chronic Conditions and Co-morbidities  Goal: Patient's chronic conditions and co-morbidity symptoms are monitored and maintained or improved  Outcome: Progressing

## 2025-06-20 NOTE — SIGNIFICANT EVENT
"Hospital Medicine Rapid Response Note    Name: Verna Montana  Age: 75 y.o.  Location: [address] [place] [address]  Code Status: DNR      Assessment/Plan:    Agonal breathing  DNR/DNI      Patient is DNR.  Attending physician contacted patient's family member.  Confirmed DNI status.      Disposition:       Narrative Summary:    Verna Montana is a 75 y.o. female with past medical history significant for EGFR positive NSCLC with brain , liver and bone mets (s/p Osimertinib,Pemetrexed, Carboplatin), HTN, multinodular goitre, and DM presenting to emergency department from [address] [place] [address] for evaluation of altered mental status and dehydration.     Rapid response called for asystole seen on telemetry.  On arrival, patient was being ventilated via Ambu bag by RT.  Patient had carotid and radial pulses.  , oxygen saturation 94%, 87/51.  Attending physician contacted family to confirm CODE STATUS.  Patient has DNR.  Conversation was had with the family regarding DNI.  Ultimately family decided on DNI status.  Patient began to desaturate on 10 L of oxygen via nasal cannula and heart rate began to decline.  Patient was pronounced  at 1730.      Physical Exam:    BP 87/51   Pulse 109   Temp 37.2 °C (99 °F)   Resp 22   Ht 1.575 m (5' 2\")   Wt 52.2 kg (115 lb)   SpO2 (!) 89%   BMI 21.03 kg/m²     Physical Exam:    Refer to death note.       Marisol Orozco DO PGY-1 Internal Medicine  Hospital Medicine    "

## 2025-06-20 NOTE — PROGRESS NOTES
6/20/2025  Per social work, family met with [facility] this am. They would like to think about it in the next 24 hrs.  Possible GIP vs return to [facility] with hospice.   Asked [facility] to make return referral.    Eva Scruggs RN [facility]

## 2025-06-22 LAB
BACTERIA BLD CULT: NORMAL
BACTERIA BLD CULT: NORMAL

## 2025-06-22 NOTE — DISCHARGE SUMMARY
Catheter removed. Discharge Diagnosis  Multifocal pneumonia           Issues Requiring Follow-Up  Discharged .    Discharge Meds     Medication List      ASK your doctor about these medications     acetaminophen 325 mg tablet; Commonly known as: Tylenol   cholecalciferol 25 mcg (1,000 units) capsule; Commonly known as: Vitamin   D-3   dexAMETHasone 2 mg tablet; Commonly known as: Decadron   Dulcolax (bisacodyl) 10 mg suppository; Generic drug: bisacodyl   famotidine 20 mg tablet; Commonly known as: Pepcid   folic acid 1 mg tablet; Commonly known as: Folvite   furosemide 20 mg tablet; Commonly known as: Lasix   ipratropium-albuteroL 0.5-2.5 mg/3 mL nebulizer solution; Commonly known   as: Duo-Neb   levoFLOXacin 250 mg/10 mL solution; Commonly known as: Levaquin   memantine 10 mg tablet; Commonly known as: Namenda   metFORMIN 500 mg tablet; Commonly known as: Glucophage   ondansetron 8 mg tablet; Commonly known as: Zofran   oxyCODONE 5 mg immediate release tablet; Commonly known as: Roxicodone   polyethylene glycol 17 gram packet; Commonly known as: Glycolax, Miralax   pravastatin 10 mg tablet; Commonly known as: Pravachol   sodium chloride 1,000 mg tablet   Tagrisso 80 mg tablet; Generic drug: osimertinib       Test Results Pending At Discharge  Pending Labs       Order Current Status    Legionella Antigen, Urine Collected (25)    Streptococcus pneumoniae Antigen, Urine Collected (25)    Extra Tubes Preliminary result    Lavender Top Preliminary result            Hospital Course         Ruben Simon MD   Physician  Internal Medicine     Progress Notes      Addendum     Date of Service: 2025  5:30 PM     Addendum       Expand All Collapse All    Verna Montana is a 75 y.o. female on day 2 of admission presenting with Multifocal pneumonia.        Subjective  - Per resident- significant event-  I was called to the bedside to pronounce that patient in room 812 had .  No spontaneous movements  were present.  There was no response to verbal or tactile stimuli.  Pupils were dilated and fixed.  Corneal reflex absent.  No breath sounds were auscultated.  No carotid pulse was palpable.  No heart sounds were auscultated over entire precordium. Patient pronounced at 17:30 on 25.      Patient discharged - Family aware, condolences given.                 Objective  Last Recorded Vitals  BP 89/53   Pulse 103   Temp 36.5 °C (97.7 °F)   Resp 22   Wt 52.2 kg (115 lb)   SpO2 90%   Intake/Output last 3 Shifts:     Intake/Output Summary (Last 24 hours) at 2025 1211  Last data filed at 2025 195      Gross per 24 hour   Intake 1140 ml   Output 500 ml   Net 640 ml         Admission Weight  Weight: 52.2 kg (115 lb) (25 0222)     Daily Weight  25 : 52.2 kg (115 lb)     Image Results  CT head wo IV contrast  Narrative: Interpreted By:  Rishi Burch,   STUDY:  CT HEAD WO IV CONTRAST;  2025 1:19 am      INDICATION:  Signs/Symptoms:change in ms. History of lung cancer per ER note.          COMPARISON:  None.      ACCESSION NUMBER(S):  [id number]      ORDERING CLINICIAN:  JOSE DE JESUS JADE      TECHNIQUE:  Noncontrast axial CT images of head were obtained with coronal and  sagittal reconstructed images.      FINDINGS:  BRAIN PARENCHYMA: There are multiple supratentorial and  infratentorial intra-axial cystic lesions in both cerebral  hemispheres and in the left cerebellar hemisphere. In for example,  there is a 1.8 cm x 2.2 cm left cerebellar cystic lesion with mural  nodule measuring 1.1 cm x 1.2 cm. There is also a 1.8 x 1.8 cm lesion  in the left upper vermis with punctate calcifications. The largest  supratentorial cystic lesion in the right centrum semi ovale measures  2 cm x 1.9 cm. Additional lesions are annotated on series 202, of  which there are nearly a dozen. These lesions demonstrate minimal or  no surrounding vasogenic edema. There is no global  intracranial  mass-effect such as midline shift or basal cistern effacement. There  are also multiple bilateral supratentorial calcifications not  associated with cystic lesions, such as within the periventricular  white matter of right frontal lobe, in the parasagittal bilateral  parietal lobes, along the body of the left lateral ventricle in the  centrum semi ovale. No juan full evidence of acute large territory  ischemic infarct. No evidence of acute intraparenchymal hemorrhage.      VENTRICLES and EXTRA-AXIAL SPACES: There is a 0.7 cm x 0.3 cm lesion  in the anterior horn of the left lateral ventricle. No acute  extra-axial or intraventricular hemorrhage. No effacement of cerebral  sulci. The ventricles and sulci are age-concordant.      PARANASAL SINUSES/MASTOIDS:  No hemorrhage or air-fluid levels within  the visualized paranasal sinuses. The mastoids are well aerated.      CALVARIUM/ORBITS:  No acute skull fracture.  The orbits and globes  are intact to the extent visualized.      EXTRACRANIAL SOFT TISSUES: No discernible acute abnormality.      Impression: 1. Over a dozen cystic lesions intra-axial involving supratentorial  and infratentorial compartments, some with associated calcifications  and mural nodularity and some purely cystic and most demonstrate  minimal or no surrounding vasogenic edema. In setting of known  metastatic cancer these are most likely metastatic lesions. The  largest in the infant internal fossa measures 2.2 cm x 1.8 cm with  mural nodule in the largest in the supratentorial compartment  measures 2 cm in the right centrum semi ovale. There is also a 0.7 cm  intraventricular lesion in the anterior horn of the left lateral  ventricle.      2. Numerous subcentimeter parenchymal calcifications in the cerebral  hemispheres not associated with cystic lesions and most likely  reflect areas of treated metastatic disease though could also be seen  in the setting of prior intracranial fungal  or parasitic infection.      3. No acute intracranial hemorrhage, mass effect, or evidence of  acute large territory ischemic infarct.      MACRO:  None.      Signed by: Rishi Burch 6/18/2025 1:46 AM  Dictation workstation:   [id number]  XR chest 1 view  Narrative: STUDY:  Chest Radiograph;  6/18/2025  00:058  INDICATION:  Weak.  COMPARISON:  None Available  ACCESSION NUMBER(S):  [id number]  ORDERING CLINICIAN:  JOSE DE JESUS JADE  TECHNIQUE:  Frontal chest was obtained at 00:08 hours.  FINDINGS:  CARDIOMEDIASTINAL SILHOUETTE:  Cardiomediastinal silhouette is mildly enlarged in size and  configuration.     LUNGS:  Airspace disease is seen in the left mid and lower lung zone.   Diminished inspiration is noted with an elevated right hemidiaphragm  and patchy airspace opacity in the right lung.     ABDOMEN:  No remarkable upper abdominal findings.     BONES:  No acute osseous changes.  Generalized osseous demineralization is  noted.  Impression: Bilateral airspace disease, left greater than right, statistically  multilobar pneumonia in the upper part critical setting.  Signed by Rakesh Hoffman        Physical Exam  Constitutional:       Appearance: She is ill-appearing.   HENT:      Mouth/Throat:      Mouth: Mucous membranes are dry.   Cardiovascular:      Rate and Rhythm: Tachycardia present.   Pulmonary:      Effort: Tachypnea present.      Breath sounds: Decreased breath sounds and rhonchi present.   Abdominal:      General: Bowel sounds are normal.   Musculoskeletal:      Cervical back: Normal range of motion.   Skin:     General: Skin is warm and dry.      Capillary Refill: Capillary refill takes less than 2 seconds.   Neurological:      Mental Status: She is disoriented.      Relevant Results                    This patient currently has cardiac telemetry ordered; if you would like to modify or discontinue the telemetry order, click here to go to the orders activity to modify/discontinue the order.                     Assessment & Plan  Multifocal pneumonia     Acute renal failure     Acute cystitis     Acute metabolic encephalopathy     Hyperglycemia     Verna is a 75-year-old female patient with past medical history significant for brain, liver, and bone cancer with metastasis, hypertension, diabetes, failure to thrive, dementia, and hyperlipidemia presenting to the ED for evaluation of altered mental status and dehydration.  Patient reportedly had a recent admission to [facility] for a UTI, dehydration, and an acute kidney injury.  Patient found to have a lactate of 2.9 with a repeat of 2.9.  Urinalysis positive for infection with urine culture pending.  Glucose 260, potassium 5.5, BUN 57, creatinine 2.39, GFR 21, alk phos 628, ammonia 54, , ALT 89.  Blood cultures obtained x 2 and pending.  Vital signs within normal limits, patient on supplemental oxygen.  Imaging consistent with metastatic cancer showing multilobar pneumonia.  Patient started on IV vancomycin and Zosyn, will remain n.p.o., provided IV maintenance fluids and admitted for further medical management.     Multifocal pneumonia/acute renal failure/acute cystitis/acute metabolic encephalopathy/hyperglycemia  Inpatient admission to SDU per Dr. Adarsh Miranda  Full code  See imaging results above  Legionella/strep urine  Blood cultures x 2  Trend lactate  DuoNeb treatments per RT  RT evaluation  Tylenol suppository as needed  N.p.o. until passed swallow evaluation  Bedside swallow evaluation  Continue IV vancomycin and Zosyn  NS x 1 L bolus in ED  Continue IV maintenance fluids  Insulin sliding scale  Repeat labs     Dementia/HLD/HTN/DM/metastatic cancer  #Chronic conditions  Nursing to complete home med rec  Patient n.p.o. until passed swallow evaluation  Monitor blood pressure  Insulin sliding scale for n.p.o.     DVT Ppx  SCDs  Heparin subcutaneous  Bedrest     6/19: Family has elected for hospice. [facility] with [facility]  tomorrow.  Patient fully evaluated  for   Assessment & Plan  Multifocal pneumonia     Acute renal failure     Acute cystitis     Acute metabolic encephalopathy     Hyperglycemia     Patient  25 @ 17:30     Prior Vital signs for last 24 hours:  Temp:  [37.2 °C (99 °F)] 37.2 °C (99 °F)  Heart Rate:  [] 44  Resp:  [22] 22  BP: (47-87)/(23-51) 47/23 Medications and labs reviewed-         Results for orders placed or performed during the hospital encounter of 25 (from the past 24 hours)   POCT GLUCOSE   Result Value Ref Range     POCT Glucose 149 (H) 74 - 99 mg/dL   POCT GLUCOSE   Result Value Ref Range     POCT Glucose 148 (H) 74 - 99 mg/dL      Patient recently received an antibiotic (last 12 hours)         None             No results found for the last 90 days.              I spent 60 minutes on the date of the service which included preparing to see the patient, face-to-face patient care, completing clinical documentation, obtaining and/or reviewing separately obtained history, performing a medically appropriate examination, counseling and educating the patient/family/caregiver, ordering medications, tests, or procedures, communicating with other HCPs (not separately reported), independently interpreting results (not separately reported), communicating results to the patient/family/caregiver, and care coordination (not separately reported        Trena Shannon                    Revision History         Pertinent Physical Exam At Time of Discharge  Physical Exam    Outpatient Follow-Up  No future appointments.      Trena Shannon

## 2025-06-23 LAB — HOLD SPECIMEN: NORMAL

## 2025-06-25 NOTE — DOCUMENTATION CLARIFICATION NOTE
"    PATIENT:               COOPER DRAPER  ACCT #:                  [id number]  MRN:                       [id number]  :                       1950  ADMIT DATE:       2025 11:32 PM  DISCH DATE:        2025 5:30 PM  RESPONDING PROVIDER #:        [id number]          PROVIDER RESPONSE TEXT:    Gram Negative PNA    CDI QUERY TEXT:    Clarification    Instruction:    Based on your assessment of the patient and the clinical information, please provide the requested documentation by clicking on the appropriate radio button and enter any additional information if prompted.    Question: Please further specify the type of pneumonia being treated    When answering this query, please exercise your independent professional judgment. The fact that a question is being asked, does not imply that any particular answer is desired or expected.    The patient's clinical indicators include:  Clinical Information: 74 y/o with pneumonia    Clinical Indicators:    23 XR chest \"LUNGS:  Airspace disease is seen in the left mid and lower lung zone.  Diminished inspiration is noted with an elevated right hemidiaphragm  and patchy airspace opacity in the right lung.\"    25 H&P Gregory \"Multifocal pneumonia\"    Treatment:  -Zosyn 4.5g IV once then 2.25 g every 6 hours x 10  -Vancocin 1 g IV once x 1  -Imaging  -Supplemental oxygen      Risk Factors: Pneumonia  Options provided:  -- Gram Negative PNA  -- Other - I will add my own diagnosis  -- Refer to Clinical Documentation Reviewer    Query created by: Salty Wilson on 2025 6:44 AM      Electronically signed by:  MARKO NARVAEZ DO 2025 8:48 AM          "